# Patient Record
Sex: FEMALE | Race: BLACK OR AFRICAN AMERICAN | NOT HISPANIC OR LATINO | Employment: UNEMPLOYED | URBAN - METROPOLITAN AREA
[De-identification: names, ages, dates, MRNs, and addresses within clinical notes are randomized per-mention and may not be internally consistent; named-entity substitution may affect disease eponyms.]

---

## 2023-03-10 ENCOUNTER — DOCUMENTATION (OUTPATIENT)
Dept: POST ACUTE CARE | Facility: EXTERNAL LOCATION | Age: 63
End: 2023-03-10

## 2023-03-10 ENCOUNTER — NURSING HOME VISIT (OUTPATIENT)
Dept: POST ACUTE CARE | Facility: EXTERNAL LOCATION | Age: 63
End: 2023-03-10
Payer: MEDICARE

## 2023-03-10 DIAGNOSIS — E78.00 PURE HYPERCHOLESTEROLEMIA: Primary | ICD-10-CM

## 2023-03-10 DIAGNOSIS — E11.00 TYPE 2 DIABETES MELLITUS WITH HYPEROSMOLARITY WITHOUT COMA, WITHOUT LONG-TERM CURRENT USE OF INSULIN (MULTI): ICD-10-CM

## 2023-03-10 DIAGNOSIS — F25.8 OTHER SCHIZOAFFECTIVE DISORDERS (MULTI): ICD-10-CM

## 2023-03-10 DIAGNOSIS — I10 PRIMARY HYPERTENSION: ICD-10-CM

## 2023-03-10 PROBLEM — G35: Status: ACTIVE | Noted: 2023-03-10

## 2023-03-10 PROBLEM — I26.99 PULMONARY EMBOLISM (MULTI): Status: ACTIVE | Noted: 2023-03-10

## 2023-03-10 PROBLEM — J44.9 COPD (CHRONIC OBSTRUCTIVE PULMONARY DISEASE) (MULTI): Status: ACTIVE | Noted: 2023-03-10

## 2023-03-10 PROBLEM — R56.9 SEIZURE (MULTI): Status: ACTIVE | Noted: 2023-03-10

## 2023-03-10 PROCEDURE — 99308 SBSQ NF CARE LOW MDM 20: CPT | Performed by: INTERNAL MEDICINE

## 2023-03-10 NOTE — LETTER
Subjective- R seen for routine follow up  Today she is sitting in her room.    Ros-  Gen- alert,NAD  Chest- no pain, no palpitations  Resp- no cough, no sOB  Abd- no pain. No N/V/D/C  Psych- depression+  Ext- swelling +    Biological/Physical:  VS- /86 T 97.1 P 69 Wt 212 Glucose- 200  Gen- NAD, sitting in bed  Chest- CTA B/L  CVS s1s2 RRR  Abd- soft, non tender  Ext- 2+ foot edema present    A/P  Seizures - c/w Keppra BID  Multiple Sclerosis w paraplegia  c/w Gilenya  c/w baclofen 15 mg for stiffness  c/w cyclobenzaprine for spasms  HTN, edema -c/w Lasix 40 mg daily  c/w ace wraps  Seizures- c/w Keppra  DM-2 - c/w metformin 500 BID, novolog  monitor  Schizoaffective dx- - stable  continue with Lexapro and Abilify  H/O PE- c/w Xarelto  COPD- stable

## 2023-03-20 LAB
BASOPHILS (10*3/UL) IN BLOOD BY AUTOMATED COUNT: 0.03 X10E9/L (ref 0–0.1)
BASOPHILS/100 LEUKOCYTES IN BLOOD BY AUTOMATED COUNT: 0.3 % (ref 0–2)
EOSINOPHILS (10*3/UL) IN BLOOD BY AUTOMATED COUNT: 0.49 X10E9/L (ref 0–0.7)
EOSINOPHILS/100 LEUKOCYTES IN BLOOD BY AUTOMATED COUNT: 4.4 % (ref 0–6)
ERYTHROCYTE DISTRIBUTION WIDTH (RATIO) BY AUTOMATED COUNT: 17.1 % (ref 11.5–14.5)
ERYTHROCYTE MEAN CORPUSCULAR HEMOGLOBIN CONCENTRATION (G/DL) BY AUTOMATED: 29.6 G/DL (ref 32–36)
ERYTHROCYTE MEAN CORPUSCULAR VOLUME (FL) BY AUTOMATED COUNT: 82 FL (ref 80–100)
ERYTHROCYTES (10*6/UL) IN BLOOD BY AUTOMATED COUNT: 4.47 X10E12/L (ref 4–5.2)
HEMATOCRIT (%) IN BLOOD BY AUTOMATED COUNT: 36.8 % (ref 36–46)
HEMOGLOBIN (G/DL) IN BLOOD: 10.9 G/DL (ref 12–16)
IMMATURE GRANULOCYTES/100 LEUKOCYTES IN BLOOD BY AUTOMATED COUNT: 0.5 % (ref 0–0.9)
LEUKOCYTES (10*3/UL) IN BLOOD BY AUTOMATED COUNT: 11 X10E9/L (ref 4.4–11.3)
LYMPHOCYTES (10*3/UL) IN BLOOD BY AUTOMATED COUNT: 1.19 X10E9/L (ref 1.2–4.8)
LYMPHOCYTES/100 LEUKOCYTES IN BLOOD BY AUTOMATED COUNT: 10.8 % (ref 13–44)
MONOCYTES (10*3/UL) IN BLOOD BY AUTOMATED COUNT: 0.77 X10E9/L (ref 0.1–1)
MONOCYTES/100 LEUKOCYTES IN BLOOD BY AUTOMATED COUNT: 7 % (ref 2–10)
NEUTROPHILS (10*3/UL) IN BLOOD BY AUTOMATED COUNT: 8.5 X10E9/L (ref 1.2–7.7)
NEUTROPHILS/100 LEUKOCYTES IN BLOOD BY AUTOMATED COUNT: 77 % (ref 40–80)
NRBC (PER 100 WBCS) BY AUTOMATED COUNT: 0 /100 WBC (ref 0–0)
PLATELETS (10*3/UL) IN BLOOD AUTOMATED COUNT: 352 X10E9/L (ref 150–450)

## 2023-04-07 ENCOUNTER — NURSING HOME VISIT (OUTPATIENT)
Dept: PRIMARY CARE | Facility: CLINIC | Age: 63
End: 2023-04-07
Payer: MEDICARE

## 2023-04-07 DIAGNOSIS — J43.9 PULMONARY EMPHYSEMA, UNSPECIFIED EMPHYSEMA TYPE (MULTI): ICD-10-CM

## 2023-04-07 DIAGNOSIS — R56.9 SEIZURE (MULTI): ICD-10-CM

## 2023-04-07 DIAGNOSIS — G35 MULTIPLE SCLEROSIS, TRANSITIONAL (MULTI): Primary | ICD-10-CM

## 2023-04-07 DIAGNOSIS — I10 PRIMARY HYPERTENSION: ICD-10-CM

## 2023-04-07 DIAGNOSIS — I82.4Y2 ACUTE DEEP VEIN THROMBOSIS (DVT) OF PROXIMAL VEIN OF LEFT LOWER EXTREMITY (MULTI): ICD-10-CM

## 2023-04-07 DIAGNOSIS — I26.99 PULMONARY EMBOLISM WITHOUT ACUTE COR PULMONALE, UNSPECIFIED CHRONICITY, UNSPECIFIED PULMONARY EMBOLISM TYPE (MULTI): ICD-10-CM

## 2023-04-07 PROCEDURE — 99309 SBSQ NF CARE MODERATE MDM 30: CPT | Performed by: INTERNAL MEDICINE

## 2023-04-07 NOTE — PROGRESS NOTES
Subjective- R seen for follow up after recent hospitalization. H/O HTN,MS with paraplegia,DM-2, admitted for SOB, fever. Had LE cellulitis, requiring iv antibiotics for sepsis.Given vanco/zosyn. Neurology consulted for MS. U/S positive for R femoral DVT. Xarelto d/cd and started on eliquis. Today sitting in her room.    Ros-  Gen-alert,NAD  Chest- no pain, no palpitations  Resp- cough +, no sOB  Abd- no pain. No N/V/D/C  Psych- depression+  Ext- swelling +    Biological/Physical:  VS- /72 T 97.8 P 84 Wt 227 Glucose- 86  Gen- NAD, sitting in bed  Chest- CTA B/L  CVS s1s2 RRR  Abd- soft, non tender  Ext- 2+ foot edema present    A/P  Sepsis due to LE cellulitis- s/p iv antibiotics  c/w bactrim po  Acute LLE DVT, past h/o PE- c/w eliquis  Multiple Sclerosis w paraplegia  c/w Gilenya  c/w baclofen 15mg for stiffness  c/w cyclobenzaprine for spasms  HTN, edema -c/w Lasix 40 mg daily  c/w ace wraps  Seizures- c/w Keppra  DM-2 - c/w metformin 500 BID  monitor  Schizoaffective dx- - stable  continue with Lexapro and Abilify  COPD- stable

## 2023-04-19 ENCOUNTER — NURSING HOME VISIT (OUTPATIENT)
Dept: POST ACUTE CARE | Facility: EXTERNAL LOCATION | Age: 63
End: 2023-04-19
Payer: MEDICARE

## 2023-04-19 DIAGNOSIS — E11.69 TYPE 2 DIABETES MELLITUS WITH OTHER SPECIFIED COMPLICATION, WITHOUT LONG-TERM CURRENT USE OF INSULIN (MULTI): ICD-10-CM

## 2023-04-19 DIAGNOSIS — I10 PRIMARY HYPERTENSION: Primary | ICD-10-CM

## 2023-04-19 DIAGNOSIS — I82.419 ACUTE DEEP VEIN THROMBOSIS (DVT) OF FEMORAL VEIN, UNSPECIFIED LATERALITY (MULTI): ICD-10-CM

## 2023-04-19 DIAGNOSIS — I26.99 PULMONARY EMBOLISM WITHOUT ACUTE COR PULMONALE, UNSPECIFIED CHRONICITY, UNSPECIFIED PULMONARY EMBOLISM TYPE (MULTI): ICD-10-CM

## 2023-04-19 DIAGNOSIS — G35 MULTIPLE SCLEROSIS, TRANSITIONAL (MULTI): ICD-10-CM

## 2023-04-19 DIAGNOSIS — R56.9 SEIZURE (MULTI): ICD-10-CM

## 2023-04-19 PROCEDURE — 99308 SBSQ NF CARE LOW MDM 20: CPT | Performed by: INTERNAL MEDICINE

## 2023-04-19 NOTE — LETTER
Patient: Arabella Choi  : 1960    Encounter Date: 2023    Subjective- R seen for follow up after recent hospitalization. Admitted for sepsis s/p abx. diagnosed with R femoral DVT. Xarelto d/cd and started on eliquis. Today sitting in her room.  Ros-  Gen-alert,NAD  Chest- no pain, no palpitations  Resp- cough +, no sOB  Abd- no pain. No N/V/D/C  Psych- depression+  Ext- swelling +  Biological/Physical:  VS- /66 T 97.6 P 86 Wt 210 Glucose- 86  Gen- NAD, sitting in bed  Chest- CTA B/L  CVS s1s2 RRR  Abd- soft, non tender  Ext- 2+ foot edema present  A/P  Sepsis due to LE cellulitis- s/p bactrim  Acute LLE DVT, past h/o PE- c/w eliquis  Multiple Sclerosis w paraplegia  c/w Gilenya  c/w baclofen 15mg for stiffness  c/w cyclobenzaprine for spasms  HTN, edema -c/w Lasix 40 mg daily  c/w ace wraps  Seizures- c/w Keppra  DM-2 - c/w metformin 500 BID  Schizoaffective dx- - stable  continue with Lexapro and Abilify  COPD- stable      Electronically Signed By: Ean Santamaria MD   23 10:16 AM

## 2023-04-21 PROBLEM — M79.7 FIBROMYALGIA: Status: ACTIVE | Noted: 2023-04-21

## 2023-04-21 PROBLEM — M06.9 RHEUMATOID ARTHRITIS (MULTI): Status: ACTIVE | Noted: 2023-04-21

## 2023-04-21 PROBLEM — E78.5 HYPERLIPIDEMIA: Status: ACTIVE | Noted: 2023-04-21

## 2023-04-21 PROBLEM — G81.94 LEFT HEMIPLEGIA (MULTI): Status: ACTIVE | Noted: 2023-04-21

## 2023-04-21 PROBLEM — E11.9 TYPE 2 DIABETES MELLITUS (MULTI): Status: ACTIVE | Noted: 2023-04-21

## 2023-04-21 NOTE — PROGRESS NOTES
Subjective- R seen for follow up after recent hospitalization. Admitted for sepsis s/p abx. diagnosed with R femoral DVT. Xarelto d/cd and started on eliquis. Today sitting in her room.  Ros-  Gen-alert,NAD  Chest- no pain, no palpitations  Resp- cough +, no sOB  Abd- no pain. No N/V/D/C  Psych- depression+  Ext- swelling +  Biological/Physical:  VS- /66 T 97.6 P 86 Wt 210 Glucose- 86  Gen- NAD, sitting in bed  Chest- CTA B/L  CVS s1s2 RRR  Abd- soft, non tender  Ext- 2+ foot edema present  A/P  Sepsis due to LE cellulitis- s/p bactrim  Acute LLE DVT, past h/o PE- c/w eliquis  Multiple Sclerosis w paraplegia  c/w Gilenya  c/w baclofen 15mg for stiffness  c/w cyclobenzaprine for spasms  HTN, edema -c/w Lasix 40 mg daily  c/w ace wraps  Seizures- c/w Keppra  DM-2 - c/w metformin 500 BID  Schizoaffective dx- - stable  continue with Lexapro and Abilify  COPD- stable

## 2023-05-24 ENCOUNTER — NURSING HOME VISIT (OUTPATIENT)
Dept: POST ACUTE CARE | Facility: EXTERNAL LOCATION | Age: 63
End: 2023-05-24
Payer: MEDICARE

## 2023-05-24 DIAGNOSIS — E11.69 TYPE 2 DIABETES MELLITUS WITH OTHER SPECIFIED COMPLICATION, WITHOUT LONG-TERM CURRENT USE OF INSULIN (MULTI): ICD-10-CM

## 2023-05-24 DIAGNOSIS — I10 PRIMARY HYPERTENSION: Primary | ICD-10-CM

## 2023-05-24 DIAGNOSIS — G35 MULTIPLE SCLEROSIS, TRANSITIONAL (MULTI): ICD-10-CM

## 2023-05-24 DIAGNOSIS — I26.99 PULMONARY EMBOLISM WITHOUT ACUTE COR PULMONALE, UNSPECIFIED CHRONICITY, UNSPECIFIED PULMONARY EMBOLISM TYPE (MULTI): ICD-10-CM

## 2023-05-24 PROCEDURE — 99308 SBSQ NF CARE LOW MDM 20: CPT | Performed by: INTERNAL MEDICINE

## 2023-05-24 NOTE — LETTER
Patient: Arabella Choi  : 1960    Encounter Date: 2023    subjective- resident sitting in her bed. She deniesany pain. She has a chronic blistering rash on both legs.  Ros-  Gen-alert,NAD  Chest- no pain, no palpitations  Resp- cough +, no sOB  Abd- no pain. No N/V/D/C  Psych- depression+  Ext- rash+  Physical exam:  VS- /66 T 97.3 P 86 Wt 204.4 Glucose- 86  Gen- NAD, sitting in bed  Chest- CTA B/L  CVS s1s2 RRR  Abd- soft, non tender  Ext- 2+ foot edema present  A/P  Acute LLE DVT, past h/o PE- c/w eliquis  Multiple Sclerosis w paraplegia  c/w Gilenya  c/w baclofen 15mg for stiffness  c/w cyclobenzaprine for spasms  HTN, edema -c/w Lasix 40 mg daily  c/w ace wraps  Seizures- c/w Keppra  DM-2 - c/w metformin 500 BID  Schizoaffective dx- - stable  continue with Lexapro and Abilify  COPD- stable      Electronically Signed By: Ean Santamaria MD   23  8:08 PM

## 2023-05-25 NOTE — PROGRESS NOTES
subjective- resident sitting in her bed. She deniesany pain. She has a chronic blistering rash on both legs.  Ros-  Gen-alert,NAD  Chest- no pain, no palpitations  Resp- cough +, no sOB  Abd- no pain. No N/V/D/C  Psych- depression+  Ext- rash+  Physical exam:  VS- /66 T 97.3 P 86 Wt 204.4 Glucose- 86  Gen- NAD, sitting in bed  Chest- CTA B/L  CVS s1s2 RRR  Abd- soft, non tender  Ext- 2+ foot edema present  A/P  Acute LLE DVT, past h/o PE- c/w eliquis  Multiple Sclerosis w paraplegia  c/w Gilenya  c/w baclofen 15mg for stiffness  c/w cyclobenzaprine for spasms  HTN, edema -c/w Lasix 40 mg daily  c/w ace wraps  Seizures- c/w Keppra  DM-2 - c/w metformin 500 BID  Schizoaffective dx- - stable  continue with Lexapro and Abilify  COPD- stable

## 2023-05-31 ENCOUNTER — NURSING HOME VISIT (OUTPATIENT)
Dept: POST ACUTE CARE | Facility: EXTERNAL LOCATION | Age: 63
End: 2023-05-31
Payer: MEDICARE

## 2023-05-31 DIAGNOSIS — G35 MULTIPLE SCLEROSIS, TRANSITIONAL (MULTI): ICD-10-CM

## 2023-05-31 DIAGNOSIS — F32.1 CURRENT MODERATE EPISODE OF MAJOR DEPRESSIVE DISORDER WITHOUT PRIOR EPISODE (MULTI): ICD-10-CM

## 2023-05-31 DIAGNOSIS — F25.8 OTHER SCHIZOAFFECTIVE DISORDERS (MULTI): ICD-10-CM

## 2023-05-31 DIAGNOSIS — D50.0 IRON DEFICIENCY ANEMIA DUE TO CHRONIC BLOOD LOSS: ICD-10-CM

## 2023-05-31 DIAGNOSIS — E11.00 TYPE 2 DIABETES MELLITUS WITH HYPEROSMOLARITY WITHOUT COMA, WITHOUT LONG-TERM CURRENT USE OF INSULIN (MULTI): ICD-10-CM

## 2023-05-31 DIAGNOSIS — I10 PRIMARY HYPERTENSION: Primary | ICD-10-CM

## 2023-05-31 DIAGNOSIS — J41.0 SIMPLE CHRONIC BRONCHITIS (MULTI): ICD-10-CM

## 2023-05-31 PROCEDURE — 99309 SBSQ NF CARE MODERATE MDM 30: CPT | Performed by: INTERNAL MEDICINE

## 2023-05-31 NOTE — LETTER
Patient: Arabella Choi  : 1960    Encounter Date: 2023    Subjective- resident seen for feeling more depressed, very low motivation.  Ros-  Gen-alert,NAD  Chest- no pain, no palpitations  Resp- cough +, no sOB  Abd- no pain. No N/V/D/C  Psych- depression+  Ext- rash+  Physical exam:  VS- /66 T 97.6 P 86 Wt 204.4 Glucose- 94  Gen- NAD, lying in bed  Chest- CTA B/L  CVS s1s2 RRR  Abd- soft, non tender  Ext- 2+ foot edema present  A/P  Depression- worsening  Remeron increased by Psych  Acute LLE DVT, past h/o PE- c/w eliquis  Multiple Sclerosis w paraplegia  c/w Gilenya  c/w baclofen 15mg for stiffness  c/w cyclobenzaprine for spasms  HTN, edema -c/w Lasix 40 mg daily  c/w ace wraps  Seizures- c/w Keppra  DM-2 - c/w metformin 500 BID  Schizoaffective dx- - stable  continue with Lexapro and Abilify  Anemia- H/H 9.2/29.3(4/10) from 11.4/37.5- recheck labs      Electronically Signed By: Ean Santamaria MD   23  8:22 PM

## 2023-06-01 NOTE — PROGRESS NOTES
Subjective- resident seen for feeling more depressed, very low motivation.  Ros-  Gen-alert,NAD  Chest- no pain, no palpitations  Resp- cough +, no sOB  Abd- no pain. No N/V/D/C  Psych- depression+  Ext- rash+  Physical exam:  VS- /66 T 97.6 P 86 Wt 204.4 Glucose- 94  Gen- NAD, lying in bed  Chest- CTA B/L  CVS s1s2 RRR  Abd- soft, non tender  Ext- 2+ foot edema present  A/P  Depression- worsening  Remeron increased by Psych  Acute LLE DVT, past h/o PE- c/w eliquis  Multiple Sclerosis w paraplegia  c/w Gilenya  c/w baclofen 15mg for stiffness  c/w cyclobenzaprine for spasms  HTN, edema -c/w Lasix 40 mg daily  c/w ace wraps  Seizures- c/w Keppra  DM-2 - c/w metformin 500 BID  Schizoaffective dx- - stable  continue with Lexapro and Abilify  Anemia- H/H 9.2/29.3(4/10) from 11.4/37.5- recheck labs

## 2023-06-28 ENCOUNTER — NURSING HOME VISIT (OUTPATIENT)
Dept: POST ACUTE CARE | Facility: EXTERNAL LOCATION | Age: 63
End: 2023-06-28
Payer: MEDICARE

## 2023-06-28 DIAGNOSIS — E78.49 OTHER HYPERLIPIDEMIA: ICD-10-CM

## 2023-06-28 DIAGNOSIS — E11.00 TYPE 2 DIABETES MELLITUS WITH HYPEROSMOLARITY WITHOUT COMA, WITHOUT LONG-TERM CURRENT USE OF INSULIN (MULTI): ICD-10-CM

## 2023-06-28 DIAGNOSIS — I10 PRIMARY HYPERTENSION: Primary | ICD-10-CM

## 2023-06-28 DIAGNOSIS — G35 MULTIPLE SCLEROSIS, TRANSITIONAL (MULTI): ICD-10-CM

## 2023-06-28 PROCEDURE — 99308 SBSQ NF CARE LOW MDM 20: CPT | Performed by: INTERNAL MEDICINE

## 2023-06-28 NOTE — LETTER
Patient: Arabella Choi  : 1960    Encounter Date: 2023    Subjective- resident sitting in her bed. Appetite- plenty of snacks and ordering out. Foot swelling worse.  Ros-  Gen-alert,NAD  Chest- no pain, no palpitations  Resp- cough +, no sOB  Abd- no pain. No N/V/D/C  Psych- depression+  Ext- pedal edema 3+  Physical exam:  VS- /66 T 97.7 P 86 Wt 213.8 Glucose- 119  Gen- NAD, sitting in bed  Chest- CTA B/L  CVS s1s2 RRR  Abd- soft, non tender  Ext- 2+ foot edema present  A/P  Schizoeffective disorder,Depression- c/w Remeron 30 mg, Abilify,lexapro  follow with psych  DVT-LLE, h/o PE- c/w eliquis  MultipleSclerosis w paraplegia  c/w Gilenya  c/w baclofen 15mg for stiffness  c/w cyclobenzaprine for spasms  HTN, edema -c/w Lasix 40 mg daily  h/O Seizures- c/w Keppra  DM-2 - c/w metformin 500 BID  Anemia- H/H 11.2/37.5-stable      Electronically Signed By: Ean Santamaria MD   23  7:43 PM

## 2023-06-28 NOTE — PROGRESS NOTES
Subjective- resident sitting in her bed. Appetite- plenty of snacks and ordering out. Foot swelling worse.  Ros-  Gen-alert,NAD  Chest- no pain, no palpitations  Resp- cough +, no sOB  Abd- no pain. No N/V/D/C  Psych- depression+  Ext- pedal edema 3+  Physical exam:  VS- /66 T 97.7 P 86 Wt 213.8 Glucose- 119  Gen- NAD, sitting in bed  Chest- CTA B/L  CVS s1s2 RRR  Abd- soft, non tender  Ext- 2+ foot edema present  A/P  Schizoeffective disorder,Depression- c/w Remeron 30 mg, Abilify,lexapro  follow with psych  DVT-LLE, h/o PE- c/w eliquis  MultipleSclerosis w paraplegia  c/w Gilenya  c/w baclofen 15mg for stiffness  c/w cyclobenzaprine for spasms  HTN, edema -c/w Lasix 40 mg daily  h/O Seizures- c/w Keppra  DM-2 - c/w metformin 500 BID  Anemia- H/H 11.2/37.5-stable

## 2023-08-09 ENCOUNTER — NURSING HOME VISIT (OUTPATIENT)
Dept: POST ACUTE CARE | Facility: EXTERNAL LOCATION | Age: 63
End: 2023-08-09
Payer: MEDICARE

## 2023-08-09 DIAGNOSIS — E78.49 OTHER HYPERLIPIDEMIA: Primary | ICD-10-CM

## 2023-08-09 DIAGNOSIS — E11.00 TYPE 2 DIABETES MELLITUS WITH HYPEROSMOLARITY WITHOUT COMA, WITHOUT LONG-TERM CURRENT USE OF INSULIN (MULTI): ICD-10-CM

## 2023-08-09 DIAGNOSIS — R56.9 SEIZURE (MULTI): ICD-10-CM

## 2023-08-09 DIAGNOSIS — F25.8 OTHER SCHIZOAFFECTIVE DISORDERS (MULTI): ICD-10-CM

## 2023-08-09 DIAGNOSIS — I26.09 OTHER ACUTE PULMONARY EMBOLISM WITH ACUTE COR PULMONALE (MULTI): ICD-10-CM

## 2023-08-09 DIAGNOSIS — I10 PRIMARY HYPERTENSION: ICD-10-CM

## 2023-08-09 PROCEDURE — 99308 SBSQ NF CARE LOW MDM 20: CPT | Performed by: INTERNAL MEDICINE

## 2023-08-09 NOTE — LETTER
Patient: Arabella Choi  : 1960    Encounter Date: 2023    Subjective- resident sitting in her bed. She is lying in bed. No new staff concerns.  Ros-  Gen-alert,NAD  Chest- no pain, no palpitations  Resp- cough +, no sOB  Abd- no pain. No N/V/D/C  Psych- depression+  Ext- pedal edema 3+  Physical exam:  VS- /66 T 97.7 P 86 Wt 215.6 Glucose- 144  Gen- NAD, sitting in bed  Chest- CTA B/L  CVS s1s2 RRR  Abd- soft, non tender  Ext- 2+ foot edema present  A/P  Schizoeffective disorder,Depression- started on latuda by Psych  c/w lexapro, remeron  DVT-LLE, h/o PE- c/w eliquis  Multiple Sclerosis with paraplegia,weakness  c/w Gilenya  c/w baclofen 15mg for stiffness  c/w cyclobenzaprine for spasms  HTN, edema -c/w Lasix 40 mg daily  h/O Seizures- c/w Keppra  DM-2 - c/w metformin 500 BID  Anemia- stable      Electronically Signed By: Ean Santamaria MD   23  1:59 PM

## 2023-08-14 NOTE — PROGRESS NOTES
Subjective- resident sitting in her bed. She is lying in bed. No new staff concerns.  Ros-  Gen-alert,NAD  Chest- no pain, no palpitations  Resp- cough +, no sOB  Abd- no pain. No N/V/D/C  Psych- depression+  Ext- pedal edema 3+  Physical exam:  VS- /66 T 97.7 P 86 Wt 215.6 Glucose- 144  Gen- NAD, sitting in bed  Chest- CTA B/L  CVS s1s2 RRR  Abd- soft, non tender  Ext- 2+ foot edema present  A/P  Schizoeffective disorder,Depression- started on latuda by Psych  c/w lexapro, remeron  DVT-LLE, h/o PE- c/w eliquis  Multiple Sclerosis with paraplegia,weakness  c/w Gilenya  c/w baclofen 15mg for stiffness  c/w cyclobenzaprine for spasms  HTN, edema -c/w Lasix 40 mg daily  h/O Seizures- c/w Keppra  DM-2 - c/w metformin 500 BID  Anemia- stable

## 2023-09-15 ENCOUNTER — NURSING HOME VISIT (OUTPATIENT)
Dept: POST ACUTE CARE | Facility: EXTERNAL LOCATION | Age: 63
End: 2023-09-15
Payer: MEDICARE

## 2023-09-15 DIAGNOSIS — E11.00 TYPE 2 DIABETES MELLITUS WITH HYPEROSMOLARITY WITHOUT COMA, WITHOUT LONG-TERM CURRENT USE OF INSULIN (MULTI): ICD-10-CM

## 2023-09-15 DIAGNOSIS — R56.9 SEIZURE (MULTI): ICD-10-CM

## 2023-09-15 DIAGNOSIS — I10 PRIMARY HYPERTENSION: Primary | ICD-10-CM

## 2023-09-15 DIAGNOSIS — G35 MULTIPLE SCLEROSIS, TRANSITIONAL (MULTI): ICD-10-CM

## 2023-09-15 DIAGNOSIS — E78.2 MIXED HYPERLIPIDEMIA: ICD-10-CM

## 2023-09-15 PROCEDURE — 99308 SBSQ NF CARE LOW MDM 20: CPT | Performed by: INTERNAL MEDICINE

## 2023-09-15 NOTE — LETTER
Patient: Arabella Choi  : 1960    Encounter Date: 09/15/2023    Subjective- resident sitting in wheelchair. denies pain. No new staff concerns. She had MRI done by neurology.  Ros-  Gen-alert,NAD  Chest- no pain, no palpitations  Resp- cough +, no sOB  Abd- no pain. No N/V/D/C  Psych- depression+  Ext- pedal edema 3+  Physical exam:  VS- /66 T 97.6 P 86 Wt 217.4 Glucose- 138  Gen- NAD, sitting in bed  Chest- CTA B/L  CVS s1s2 RRR  Abd- soft, non tender  Ext- 2+ foot edema present  A/P  Multiple Sclerosis with paraplegia,weakness  c/w Gilenya  c/w baclofen 15mg for stiffness  c/w cyclobenzaprine for spasms  Schizoeffective disorder,Depression- c/w latuda  Followed by Psych  c/w lexapro, remeron  DVT-LLE, h/o PE- c/w eliquis  Multiple Sclerosis with paraplegia,weakness  c/w Gilenya  c/w baclofen 15mg for stiffness  c/w cyclobenzaprine for spasms  HTN, edema -c/w Lasix 40 mg daily  Saw vascular dr Gay today- stated tubi gripsfor leg edema  h/O Seizures- c/w Keppra  DM-2 - c/w metformin 500 BID  Anemia- stable      Electronically Signed By: Ean Santamaria MD   9/15/23  8:34 PM

## 2023-09-16 NOTE — PROGRESS NOTES
Subjective- resident sitting in wheelchair. denies pain. No new staff concerns. She had MRI done by neurology.  Ros-  Gen-alert,NAD  Chest- no pain, no palpitations  Resp- cough +, no sOB  Abd- no pain. No N/V/D/C  Psych- depression+  Ext- pedal edema 3+  Physical exam:  VS- /66 T 97.6 P 86 Wt 217.4 Glucose- 138  Gen- NAD, sitting in bed  Chest- CTA B/L  CVS s1s2 RRR  Abd- soft, non tender  Ext- 2+ foot edema present  A/P  Multiple Sclerosis with paraplegia,weakness  c/w Gilenya  c/w baclofen 15mg for stiffness  c/w cyclobenzaprine for spasms  Schizoeffective disorder,Depression- c/w latuda  Followed by Psych  c/w lexapro, remeron  DVT-LLE, h/o PE- c/w eliquis  Multiple Sclerosis with paraplegia,weakness  c/w Gilenya  c/w baclofen 15mg for stiffness  c/w cyclobenzaprine for spasms  HTN, edema -c/w Lasix 40 mg daily  Saw vascular dr Gay today- stated tubi gripsfor leg edema  h/O Seizures- c/w Keppra  DM-2 - c/w metformin 500 BID  Anemia- stable

## 2023-09-22 PROBLEM — I82.409 DEEP VEIN THROMBOSIS (DVT) OF LOWER EXTREMITY (MULTI): Status: ACTIVE | Noted: 2023-02-19

## 2023-09-22 PROBLEM — Z86.711 HISTORY OF PULMONARY EMBOLISM: Status: ACTIVE | Noted: 2023-04-05

## 2023-09-22 PROBLEM — I99.8 VASCULAR INSUFFICIENCY: Status: ACTIVE | Noted: 2023-09-22

## 2023-09-22 PROBLEM — I89.0 LYMPHEDEMA OF LOWER EXTREMITY: Status: ACTIVE | Noted: 2023-09-22

## 2023-09-22 RX ORDER — NYSTATIN 100000 [USP'U]/G
POWDER TOPICAL
COMMUNITY

## 2023-09-22 RX ORDER — GLIMEPIRIDE 4 MG/1
TABLET ORAL
COMMUNITY
Start: 2023-09-15

## 2023-09-22 RX ORDER — LOVASTATIN 40 MG/1
TABLET ORAL
COMMUNITY

## 2023-09-22 RX ORDER — FENOFIBRATE 40 MG/1
40 TABLET ORAL DAILY
COMMUNITY
Start: 2023-03-09

## 2023-09-22 RX ORDER — POTASSIUM CHLORIDE 750 MG/1
10 TABLET, FILM COATED, EXTENDED RELEASE ORAL DAILY
COMMUNITY
Start: 2022-12-03

## 2023-09-22 RX ORDER — LEVETIRACETAM 500 MG/1
500 TABLET ORAL 2 TIMES DAILY
COMMUNITY
Start: 2023-09-15

## 2023-09-22 RX ORDER — MELATONIN 3 MG
1 CAPSULE ORAL NIGHTLY
COMMUNITY
Start: 2023-03-09

## 2023-09-22 RX ORDER — METFORMIN HYDROCHLORIDE 500 MG/1
TABLET ORAL EVERY 12 HOURS
COMMUNITY
Start: 2023-03-09

## 2023-09-22 RX ORDER — MIRTAZAPINE 15 MG/1
30 TABLET, FILM COATED ORAL NIGHTLY
COMMUNITY
Start: 2023-03-09

## 2023-09-22 RX ORDER — ESCITALOPRAM OXALATE 20 MG/1
TABLET ORAL
COMMUNITY
Start: 2023-09-15

## 2023-09-22 RX ORDER — LURASIDONE HYDROCHLORIDE 60 MG/1
60 TABLET, FILM COATED ORAL NIGHTLY
COMMUNITY
Start: 2023-09-15

## 2023-09-22 RX ORDER — FERROUS SULFATE 325(65) MG
TABLET ORAL
COMMUNITY
Start: 2022-12-03

## 2023-09-22 RX ORDER — ONDANSETRON 4 MG/1
TABLET, ORALLY DISINTEGRATING ORAL 3 TIMES DAILY PRN
COMMUNITY
Start: 2023-03-09

## 2023-09-22 RX ORDER — LORATADINE 10 MG/1
10 TABLET ORAL DAILY PRN
COMMUNITY
Start: 2023-03-09

## 2023-09-22 RX ORDER — ARIPIPRAZOLE 20 MG/1
TABLET ORAL
COMMUNITY
Start: 2023-03-09

## 2023-09-22 RX ORDER — BACLOFEN 10 MG/1
TABLET ORAL EVERY 12 HOURS
COMMUNITY
Start: 2021-09-14

## 2023-09-22 RX ORDER — FUROSEMIDE 40 MG/1
TABLET ORAL
COMMUNITY
Start: 2023-09-15

## 2023-11-15 ENCOUNTER — NURSING HOME VISIT (OUTPATIENT)
Dept: POST ACUTE CARE | Facility: EXTERNAL LOCATION | Age: 63
End: 2023-11-15
Payer: MEDICARE

## 2023-11-15 DIAGNOSIS — E11.00 TYPE 2 DIABETES MELLITUS WITH HYPEROSMOLARITY WITHOUT COMA, WITHOUT LONG-TERM CURRENT USE OF INSULIN (MULTI): ICD-10-CM

## 2023-11-15 DIAGNOSIS — G35 MULTIPLE SCLEROSIS, TRANSITIONAL (MULTI): ICD-10-CM

## 2023-11-15 DIAGNOSIS — I26.99 PULMONARY EMBOLISM WITHOUT ACUTE COR PULMONALE, UNSPECIFIED CHRONICITY, UNSPECIFIED PULMONARY EMBOLISM TYPE (MULTI): ICD-10-CM

## 2023-11-15 DIAGNOSIS — I10 PRIMARY HYPERTENSION: Primary | ICD-10-CM

## 2023-11-15 DIAGNOSIS — F25.8 OTHER SCHIZOAFFECTIVE DISORDERS (MULTI): ICD-10-CM

## 2023-11-15 PROCEDURE — 99309 SBSQ NF CARE MODERATE MDM 30: CPT | Performed by: INTERNAL MEDICINE

## 2023-11-15 NOTE — LETTER
Patient: Arabella Choi  : 1960    Encounter Date: 11/15/2023    Subjective- resident is seen for monthly follow up.She denies pain. No staff concerns. Appetite remains good.  Ros-  Gen-alert,NAD  Chest- no pain, no palpitations  Resp- no cough, no SOB  Abd- no pain. No N/V/D/C  Psych- depression+  Ext- pedal edema 1-2+  Physical exam:  VS- /66 T 97.6 P 86 Wt224 Glucose- 265  Gen- NAD, sitting in bed  Chest- CTA B/L  CVS s1s2 RRR  Abd- soft, non tender  Ext- 2+ foot edema present    A/P  Multiple Sclerosis with paraplegia,weakness  c/w Gilenya  c/w baclofen 15mg for stiffness  c/w cyclobenzaprine for spasms  Follow ith neurology as scheduled  Schizoeffective disorder,Depression- c/w latuda  c/w lexapro, remeron  DVT-LLE, h/o PE- c/w eliquis  Multiple Sclerosis with paraplegia,weakness  c/w Gilenya  c/w baclofen 15mg for stiffness  c/w cyclobenzaprine for spasms  HTN, edema -c/w Lasix 40 mg daily  h/O Seizures- c/w Keppra  DM-2 - c/w metformin, glimepiride  check hba1c  Anemia- stable      Electronically Signed By: Ean Santamaria MD   23 11:01 AM

## 2023-11-16 NOTE — PROGRESS NOTES
Subjective- resident is seen for monthly follow up.She denies pain. No staff concerns. Appetite remains good.  Ros-  Gen-alert,NAD  Chest- no pain, no palpitations  Resp- no cough, no SOB  Abd- no pain. No N/V/D/C  Psych- depression+  Ext- pedal edema 1-2+  Physical exam:  VS- /66 T 97.6 P 86 Wt224 Glucose- 265  Gen- NAD, sitting in bed  Chest- CTA B/L  CVS s1s2 RRR  Abd- soft, non tender  Ext- 2+ foot edema present    A/P  Multiple Sclerosis with paraplegia,weakness  c/w Gilenya  c/w baclofen 15mg for stiffness  c/w cyclobenzaprine for spasms  Follow ith neurology as scheduled  Schizoeffective disorder,Depression- c/w latuda  c/w lexapro, remeron  DVT-LLE, h/o PE- c/w eliquis  Multiple Sclerosis with paraplegia,weakness  c/w Gilenya  c/w baclofen 15mg for stiffness  c/w cyclobenzaprine for spasms  HTN, edema -c/w Lasix 40 mg daily  h/O Seizures- c/w Keppra  DM-2 - c/w metformin, glimepiride  check hba1c  Anemia- stable

## 2023-12-20 ENCOUNTER — NURSING HOME VISIT (OUTPATIENT)
Dept: POST ACUTE CARE | Facility: EXTERNAL LOCATION | Age: 63
End: 2023-12-20
Payer: MEDICARE

## 2023-12-20 DIAGNOSIS — I26.99 PULMONARY EMBOLISM WITHOUT ACUTE COR PULMONALE, UNSPECIFIED CHRONICITY, UNSPECIFIED PULMONARY EMBOLISM TYPE (MULTI): ICD-10-CM

## 2023-12-20 DIAGNOSIS — G35 MULTIPLE SCLEROSIS, TRANSITIONAL (MULTI): ICD-10-CM

## 2023-12-20 DIAGNOSIS — E11.00 TYPE 2 DIABETES MELLITUS WITH HYPEROSMOLARITY WITHOUT COMA, WITHOUT LONG-TERM CURRENT USE OF INSULIN (MULTI): ICD-10-CM

## 2023-12-20 DIAGNOSIS — I10 PRIMARY HYPERTENSION: Primary | ICD-10-CM

## 2023-12-20 PROCEDURE — 99308 SBSQ NF CARE LOW MDM 20: CPT | Performed by: INTERNAL MEDICINE

## 2023-12-20 NOTE — LETTER
Patient: Arabella Choi  : 1960    Encounter Date: 2023    Subjective- resident is seen for monthly follow up.She is lying in bed. denies pain. No staff concerns. Appetite remains good.  Ros-  Gen-alert,NAD  Chest- no pain, no palpitations  Resp- no cough, no SOB  Abd- no pain. No N/V/D/C  Psych- depression+  Ext- pedal edema 1-2+  Physical exam:  VS- /66 T 97.6 P 86 Wt 225 Glucose- 266  Gen- NAD, sitting in bed  Chest- CTA B/L  CVS s1s2 RRR  Abd- soft, non tender  Ext- 2+ foot edema present  A/P  Multiple Sclerosis with paraplegia,weakness  c/w Gilenya  c/w baclofen 15mg for stiffness  c/w cyclobenzaprine  Follow eith neurology as scheduled  Schizoeffective disorder,Depression- c/w latuda  c/w lexapro, remeron  DVT-LLE, h/o PE- c/w eliquis  HTN, edema -c/w Lasix 40 mg daily  h/O Seizures- c/w Keppra  DM-2 - c/w metformin, glimepiride  hba1c 7.4      Electronically Signed By: Ean Santamaria MD   23 10:55 AM

## 2023-12-22 NOTE — PROGRESS NOTES
Subjective- resident is seen for monthly follow up.She is lying in bed. denies pain. No staff concerns. Appetite remains good.  Ros-  Gen-alert,NAD  Chest- no pain, no palpitations  Resp- no cough, no SOB  Abd- no pain. No N/V/D/C  Psych- depression+  Ext- pedal edema 1-2+  Physical exam:  VS- /66 T 97.6 P 86 Wt 225 Glucose- 266  Gen- NAD, sitting in bed  Chest- CTA B/L  CVS s1s2 RRR  Abd- soft, non tender  Ext- 2+ foot edema present  A/P  Multiple Sclerosis with paraplegia,weakness  c/w Gilenya  c/w baclofen 15mg for stiffness  c/w cyclobenzaprine  Follow eith neurology as scheduled  Schizoeffective disorder,Depression- c/w latuda  c/w lexapro, remeron  DVT-LLE, h/o PE- c/w eliquis  HTN, edema -c/w Lasix 40 mg daily  h/O Seizures- c/w Keppra  DM-2 - c/w metformin, glimepiride  hba1c 7.4

## 2023-12-28 ENCOUNTER — OFFICE VISIT (OUTPATIENT)
Dept: NEPHROLOGY | Facility: CLINIC | Age: 63
End: 2023-12-28
Payer: MEDICARE

## 2023-12-28 VITALS
SYSTOLIC BLOOD PRESSURE: 131 MMHG | HEART RATE: 90 BPM | HEIGHT: 64 IN | DIASTOLIC BLOOD PRESSURE: 80 MMHG | WEIGHT: 225 LBS | BODY MASS INDEX: 38.41 KG/M2

## 2023-12-28 RX ORDER — ADHESIVE BANDAGE
30 BANDAGE TOPICAL DAILY PRN
COMMUNITY

## 2023-12-28 ASSESSMENT — PAIN SCALES - GENERAL: PAINLEVEL: 0-NO PAIN

## 2024-01-24 ENCOUNTER — NURSING HOME VISIT (OUTPATIENT)
Dept: POST ACUTE CARE | Facility: EXTERNAL LOCATION | Age: 64
End: 2024-01-24
Payer: MEDICARE

## 2024-01-24 DIAGNOSIS — G35 MULTIPLE SCLEROSIS, TRANSITIONAL (MULTI): ICD-10-CM

## 2024-01-24 DIAGNOSIS — I10 PRIMARY HYPERTENSION: Primary | ICD-10-CM

## 2024-01-24 DIAGNOSIS — I26.99 PULMONARY EMBOLISM WITHOUT ACUTE COR PULMONALE, UNSPECIFIED CHRONICITY, UNSPECIFIED PULMONARY EMBOLISM TYPE (MULTI): ICD-10-CM

## 2024-01-24 DIAGNOSIS — E11.00 TYPE 2 DIABETES MELLITUS WITH HYPEROSMOLARITY WITHOUT COMA, WITHOUT LONG-TERM CURRENT USE OF INSULIN (MULTI): ICD-10-CM

## 2024-01-24 DIAGNOSIS — F25.8 OTHER SCHIZOAFFECTIVE DISORDERS (MULTI): ICD-10-CM

## 2024-01-24 PROCEDURE — 99308 SBSQ NF CARE LOW MDM 20: CPT | Performed by: INTERNAL MEDICINE

## 2024-01-24 NOTE — LETTER
Patient: Arabella Choi  : 1960    Encounter Date: 2024    Subjective- resident is seen for monthly follow up. She is sitting in bed. denies pain. No staff concerns. Appetite remains good.  Ros-  Gen-alert,NAD  Chest- no pain, no palpitations  Resp- no cough, no SOB  Abd- no pain. No N/V/D/C  Psych- depression+  Ext- pedal edema 1-2+  Physical exam:  VS- /66 T 97.6 P 86 Wt 227.6 Glucose- 269  Gen- NAD, sitting in bed  Chest- CTA B/L  CVS s1s2 RRR  Abd- soft, non tender  Ext- 2+ foot edema present  A/P  Multiple Sclerosis with paraplegia  c/w Gilenya  c/w baclofen 15mg for stiffness  c/w cyclobenzaprine  Follow with neurology as scheduled  Schizoeffective disorder,Depression- c/w latuda  c/w lexapro, remeron  DVT-LLE, h/o PE- c/w eliquis  HTN, edema -c/w Lasix 40 mg daily  h/O Seizures- c/w Keppra  DM-2 - c/w metformin, glimepiride  Labs ordered      Electronically Signed By: Ean Santamaria MD   24  7:09 PM

## 2024-01-26 NOTE — PROGRESS NOTES
Subjective- resident is seen for monthly follow up. She is sitting in bed. denies pain. No staff concerns. Appetite remains good.  Ros-  Gen-alert,NAD  Chest- no pain, no palpitations  Resp- no cough, no SOB  Abd- no pain. No N/V/D/C  Psych- depression+  Ext- pedal edema 1-2+  Physical exam:  VS- /66 T 97.6 P 86 Wt 227.6 Glucose- 269  Gen- NAD, sitting in bed  Chest- CTA B/L  CVS s1s2 RRR  Abd- soft, non tender  Ext- 2+ foot edema present  A/P  Multiple Sclerosis with paraplegia  c/w Gilenya  c/w baclofen 15mg for stiffness  c/w cyclobenzaprine  Follow with neurology as scheduled  Schizoeffective disorder,Depression- c/w latuda  c/w lexapro, remeron  DVT-LLE, h/o PE- c/w eliquis  HTN, edema -c/w Lasix 40 mg daily  h/O Seizures- c/w Keppra  DM-2 - c/w metformin, glimepiride  Labs ordered

## 2024-02-14 ENCOUNTER — NURSING HOME VISIT (OUTPATIENT)
Dept: POST ACUTE CARE | Facility: EXTERNAL LOCATION | Age: 64
End: 2024-02-14
Payer: MEDICARE

## 2024-02-14 DIAGNOSIS — F25.8 OTHER SCHIZOAFFECTIVE DISORDERS (MULTI): ICD-10-CM

## 2024-02-14 DIAGNOSIS — I10 PRIMARY HYPERTENSION: Primary | ICD-10-CM

## 2024-02-14 DIAGNOSIS — I26.99 PULMONARY EMBOLISM WITHOUT ACUTE COR PULMONALE, UNSPECIFIED CHRONICITY, UNSPECIFIED PULMONARY EMBOLISM TYPE (MULTI): ICD-10-CM

## 2024-02-14 DIAGNOSIS — G35 MULTIPLE SCLEROSIS, TRANSITIONAL (MULTI): ICD-10-CM

## 2024-02-14 DIAGNOSIS — E11.00 TYPE 2 DIABETES MELLITUS WITH HYPEROSMOLARITY WITHOUT COMA, WITHOUT LONG-TERM CURRENT USE OF INSULIN (MULTI): ICD-10-CM

## 2024-02-14 PROCEDURE — 99309 SBSQ NF CARE MODERATE MDM 30: CPT | Performed by: INTERNAL MEDICINE

## 2024-02-14 NOTE — LETTER
Patient: Arabella Choi  : 1960    Encounter Date: 2024    Subjective- resident is seen for monthly follow up.She is sitting in bed. denies pain. No staff concerns. Appetite remains good.  Ros-  Gen-alert,NAD  Chest- no pain, no palpitations  Resp- no cough, no SOB  Abd- no pain. No N/V/D/C  Ext- pedal edema 1-2+  Physical exam:  VS- /66 T 97.6 P 86 Wt 231.6 Glucose- 315  Gen- NAD, sitting in bed  Chest- CTA B/L  CVS s1s2 RRR  Abd- soft, non tender  Ext- 2+ foot edema present  A/P  Multiple Sclerosis with paraplegia  c/w Gilenya  c/w baclofen 15mg for stiffness  c/w cyclobenzaprine  Follow with neurology as scheduled  Schizoeffective disorder,Depression- c/w latuda  c/w lexapro, remeron  DVT-LLE, h/o PE- c/w eliquis  HTN, edema -c/w Lasix 40 mg daily  h/O Seizures- c/w Keppra  DM-2 - Hba1c 9.8 - uncontrolled  c/w metformin, glimepiride  Start lantus 10units daily      Electronically Signed By: Ean Santamaria MD   2/15/24  2:37 PM

## 2024-02-15 NOTE — PROGRESS NOTES
Subjective- resident is seen for monthly follow up.She is sitting in bed. denies pain. No staff concerns. Appetite remains good.  Ros-  Gen-alert,NAD  Chest- no pain, no palpitations  Resp- no cough, no SOB  Abd- no pain. No N/V/D/C  Ext- pedal edema 1-2+  Physical exam:  VS- /66 T 97.6 P 86 Wt 231.6 Glucose- 315  Gen- NAD, sitting in bed  Chest- CTA B/L  CVS s1s2 RRR  Abd- soft, non tender  Ext- 2+ foot edema present  A/P  Multiple Sclerosis with paraplegia  c/w Gilenya  c/w baclofen 15mg for stiffness  c/w cyclobenzaprine  Follow with neurology as scheduled  Schizoeffective disorder,Depression- c/w latuda  c/w lexapro, remeron  DVT-LLE, h/o PE- c/w eliquis  HTN, edema -c/w Lasix 40 mg daily  h/O Seizures- c/w Keppra  DM-2 - Hba1c 9.8 - uncontrolled  c/w metformin, glimepiride  Start lantus 10units daily

## 2024-03-11 ENCOUNTER — HOSPITAL ENCOUNTER (OUTPATIENT)
Dept: RADIOLOGY | Facility: HOSPITAL | Age: 64
Discharge: HOME | End: 2024-03-11
Payer: COMMERCIAL

## 2024-03-11 VITALS — WEIGHT: 224.87 LBS | HEIGHT: 64 IN | BODY MASS INDEX: 38.39 KG/M2

## 2024-03-11 DIAGNOSIS — Z12.31 ENCOUNTER FOR SCREENING MAMMOGRAM FOR MALIGNANT NEOPLASM OF BREAST: ICD-10-CM

## 2024-03-11 PROCEDURE — 77067 SCR MAMMO BI INCL CAD: CPT

## 2024-03-11 PROCEDURE — 77063 BREAST TOMOSYNTHESIS BI: CPT | Performed by: RADIOLOGY

## 2024-03-11 PROCEDURE — 77067 SCR MAMMO BI INCL CAD: CPT | Performed by: RADIOLOGY

## 2024-03-27 ENCOUNTER — NURSING HOME VISIT (OUTPATIENT)
Dept: POST ACUTE CARE | Facility: EXTERNAL LOCATION | Age: 64
End: 2024-03-27
Payer: MEDICARE

## 2024-03-27 DIAGNOSIS — G82.20 PARAPLEGIA, UNSPECIFIED (MULTI): Primary | ICD-10-CM

## 2024-03-27 DIAGNOSIS — R56.9 SEIZURE (MULTI): ICD-10-CM

## 2024-03-27 DIAGNOSIS — E66.01 OBESITY, MORBID (MULTI): ICD-10-CM

## 2024-03-27 DIAGNOSIS — N18.30 STAGE 3 CHRONIC KIDNEY DISEASE, UNSPECIFIED WHETHER STAGE 3A OR 3B CKD (MULTI): ICD-10-CM

## 2024-03-27 DIAGNOSIS — D68.59 OTHER PRIMARY THROMBOPHILIA (MULTI): ICD-10-CM

## 2024-03-27 DIAGNOSIS — I26.09 OTHER ACUTE PULMONARY EMBOLISM WITH ACUTE COR PULMONALE (MULTI): ICD-10-CM

## 2024-03-27 PROBLEM — G81.94 LEFT HEMIPLEGIA (MULTI): Status: RESOLVED | Noted: 2023-04-21 | Resolved: 2024-03-27

## 2024-03-27 PROBLEM — J44.9 COPD (CHRONIC OBSTRUCTIVE PULMONARY DISEASE) (MULTI): Status: RESOLVED | Noted: 2023-03-10 | Resolved: 2024-03-27

## 2024-03-27 PROBLEM — M06.9 RHEUMATOID ARTHRITIS (MULTI): Status: RESOLVED | Noted: 2023-04-21 | Resolved: 2024-03-27

## 2024-03-27 NOTE — LETTER
Patient: Arabella Choi  : 1960    Encounter Date: 2024    Subjective- resident is seen for monthly follow up.She is sitting in bed. denies pain. No staff concerns. Appetite remains good.  Ros-  Gen-alert,NAD  Chest- no pain, no palpitations  Resp- no cough, no SOB  Abd- no pain. No N/V/D/C  Ext- pedal edema 1-2+  Physical exam:  VS- /66 T 97.4 P 86 Wt 228.6 Glucose- 263  Gen- NAD, sitting in bed  Chest- CTA B/L  CVS s1s2 RRR  Abd- soft, non tender  Ext- 2+ foot edema present  A/P  Multiple Sclerosis with paraplegia  c/w Gilenya  c/w baclofen  c/w cyclobenzaprine  Follow with neurology as scheduled  Schizoeffective disorder,Depression- stable  c/w latuda  c/w lexapro, remeron  DVT-LLE, h/o PE- c/w eliquis  HTN, edema -c/w Lasix 40 mg daily  h/O Seizures- c/w Keppra  DM-2 - Hba1c 9.8 - uncontrolled  c/w metformin, glimepiride, lantus      Electronically Signed By: Ean Santamaria MD   3/27/24  3:10 PM

## 2024-03-27 NOTE — PROGRESS NOTES
Subjective- resident is seen for monthly follow up.She is sitting in bed. denies pain. No staff concerns. Appetite remains good.  Ros-  Gen-alert,NAD  Chest- no pain, no palpitations  Resp- no cough, no SOB  Abd- no pain. No N/V/D/C  Ext- pedal edema 1-2+  Physical exam:  VS- /66 T 97.4 P 86 Wt 228.6 Glucose- 263  Gen- NAD, sitting in bed  Chest- CTA B/L  CVS s1s2 RRR  Abd- soft, non tender  Ext- 2+ foot edema present  A/P  Multiple Sclerosis with paraplegia  c/w Gilenya  c/w baclofen  c/w cyclobenzaprine  Follow with neurology as scheduled  Schizoeffective disorder,Depression- stable  c/w latuda  c/w lexapro, remeron  DVT-LLE, h/o PE- c/w eliquis  HTN, edema -c/w Lasix 40 mg daily  h/O Seizures- c/w Keppra  DM-2 - Hba1c 9.8 - uncontrolled  c/w metformin, glimepiride, lantus

## 2024-04-03 ENCOUNTER — NURSING HOME VISIT (OUTPATIENT)
Dept: PRIMARY CARE | Facility: CLINIC | Age: 64
End: 2024-04-03
Payer: MEDICARE

## 2024-04-03 DIAGNOSIS — E08.00 DIABETES MELLITUS DUE TO UNDERLYING CONDITION WITH HYPEROSMOLARITY WITHOUT COMA, WITH LONG-TERM CURRENT USE OF INSULIN (MULTI): Primary | ICD-10-CM

## 2024-04-03 DIAGNOSIS — G35 MULTIPLE SCLEROSIS, TRANSITIONAL (MULTI): ICD-10-CM

## 2024-04-03 DIAGNOSIS — J43.9 PULMONARY EMPHYSEMA, UNSPECIFIED EMPHYSEMA TYPE (MULTI): ICD-10-CM

## 2024-04-03 DIAGNOSIS — Z79.4 DIABETES MELLITUS DUE TO UNDERLYING CONDITION WITH HYPEROSMOLARITY WITHOUT COMA, WITH LONG-TERM CURRENT USE OF INSULIN (MULTI): Primary | ICD-10-CM

## 2024-04-03 DIAGNOSIS — I10 PRIMARY HYPERTENSION: ICD-10-CM

## 2024-04-04 ENCOUNTER — APPOINTMENT (OUTPATIENT)
Dept: RADIOLOGY | Facility: HOSPITAL | Age: 64
End: 2024-04-04
Payer: MEDICARE

## 2024-04-04 ENCOUNTER — HOSPITAL ENCOUNTER (OUTPATIENT)
Dept: RADIOLOGY | Facility: HOSPITAL | Age: 64
Discharge: HOME | End: 2024-04-04
Payer: COMMERCIAL

## 2024-04-04 DIAGNOSIS — N64.59 ABNORMAL BREAST EXAM: ICD-10-CM

## 2024-04-04 PROCEDURE — 76642 ULTRASOUND BREAST LIMITED: CPT | Mod: RIGHT SIDE | Performed by: STUDENT IN AN ORGANIZED HEALTH CARE EDUCATION/TRAINING PROGRAM

## 2024-04-04 PROCEDURE — 76981 USE PARENCHYMA: CPT | Mod: RT

## 2024-04-04 PROCEDURE — 76642 ULTRASOUND BREAST LIMITED: CPT | Mod: RT

## 2024-04-04 NOTE — PROGRESS NOTES
Subjective- resident is seen for follow up on diabetes. Glucose - in 300s. Hba1c 11.2. She eats unhealthy sugary snacks foods all day. Today she is sitting in bed. No staff concerns.  Ros-  Gen-alert,NAD  Chest- no pain, no palpitations  Resp- no cough, no SOB  Abd- no pain. No N/V/D/C  Ext- pedal edema 1+  Physical exam:  VS- /66 T 97.4 P 86 Wt 228.6 Glucose- 307  Gen- NAD, sitting in bed  Chest- CTA B/L  CVS s1s2 RRR  Abd- soft, non tender  Ext- 2+ foot edema present  A/P  DM-2- Hba1c 11.2 un controlled due to noncompliance with diet  Increaselantus to 15 units  Increase glimepiride to 2 mg BID  c/w metformin  Multiple Sclerosis with paraplegia  c/w Gilenya  c/w baclofen  c/w cyclobenzaprine  Schizoeffective disorder,Depression- stable  c/w latuda  c/w lexapro, remeron  DVT-LLE, h/o PE- c/w eliquis  HTN, edema -c/w Lasix 40 mg daily  h/O Seizures- c/w Keppra

## 2024-04-24 ENCOUNTER — NURSING HOME VISIT (OUTPATIENT)
Dept: POST ACUTE CARE | Facility: EXTERNAL LOCATION | Age: 64
End: 2024-04-24
Payer: MEDICARE

## 2024-04-24 DIAGNOSIS — I10 PRIMARY HYPERTENSION: ICD-10-CM

## 2024-04-24 DIAGNOSIS — I26.99 PULMONARY EMBOLISM WITHOUT ACUTE COR PULMONALE, UNSPECIFIED CHRONICITY, UNSPECIFIED PULMONARY EMBOLISM TYPE (MULTI): ICD-10-CM

## 2024-04-24 DIAGNOSIS — G35 MULTIPLE SCLEROSIS, TRANSITIONAL (MULTI): ICD-10-CM

## 2024-04-24 DIAGNOSIS — E11.00 TYPE 2 DIABETES MELLITUS WITH HYPEROSMOLARITY WITHOUT COMA, WITHOUT LONG-TERM CURRENT USE OF INSULIN (MULTI): Primary | ICD-10-CM

## 2024-04-24 NOTE — LETTER
Patient: Arabella Choi  : 1960    Encounter Date: 2024    Subjective- resident is seen for follow up on diabetes. Glucose - in 250s. Hba1c 11.2. She eats unhealthy sugary snacks foods all day. Today she is sitting in bed. No staff concerns.  Ros-  Gen-alert,NAD  Chest- no pain, no palpitations  Resp- no cough, no SOB  Abd- no pain. No N/V/D/C  Ext- pedal edema 1+  Physical exam:  VS- /66 T 97.4 P 86 Wt 228.2 Glucose- 238  Gen- NAD, sitting in bed  Chest- CTA B/L  CVS s1s2 RRR  Abd- soft, non tender  Ext- 2+ foot edema present    A/P  DM-2- Hba1c 11.2 uncontrolled due to noncompliance with diet  Increase lantus to 20 units  Increase glimepiride to 2 mg BID  c/w metformin  Multiple Sclerosis with paraplegia  c/w Gilenya  c/w baclofen  c/w cyclobenzaprine  Schizoeffective disorder,Depression- stable  c/w latuda  c/w lexapro, remeron  DVT-LLE, h/o PE- c/w eliquis  HTN, edema -c/w Lasix 40 mg daily  h/O Seizures- c/w Keppra      Electronically Signed By: Ean Santamaria MD   24 10:47 AM

## 2024-04-25 NOTE — PROGRESS NOTES
Subjective- resident is seen for follow up on diabetes. Glucose - in 250s. Hba1c 11.2. She eats unhealthy sugary snacks foods all day. Today she is sitting in bed. No staff concerns.  Ros-  Gen-alert,NAD  Chest- no pain, no palpitations  Resp- no cough, no SOB  Abd- no pain. No N/V/D/C  Ext- pedal edema 1+  Physical exam:  VS- /66 T 97.4 P 86 Wt 228.2 Glucose- 238  Gen- NAD, sitting in bed  Chest- CTA B/L  CVS s1s2 RRR  Abd- soft, non tender  Ext- 2+ foot edema present    A/P  DM-2- Hba1c 11.2 uncontrolled due to noncompliance with diet  Increase lantus to 20 units  Increase glimepiride to 2 mg BID  c/w metformin  Multiple Sclerosis with paraplegia  c/w Gilenya  c/w baclofen  c/w cyclobenzaprine  Schizoeffective disorder,Depression- stable  c/w latuda  c/w lexapro, remeron  DVT-LLE, h/o PE- c/w eliquis  HTN, edema -c/w Lasix 40 mg daily  h/O Seizures- c/w Keppra

## 2024-05-10 ENCOUNTER — NURSING HOME VISIT (OUTPATIENT)
Dept: POST ACUTE CARE | Facility: EXTERNAL LOCATION | Age: 64
End: 2024-05-10
Payer: MEDICARE

## 2024-05-10 DIAGNOSIS — I26.99 PULMONARY EMBOLISM WITHOUT ACUTE COR PULMONALE, UNSPECIFIED CHRONICITY, UNSPECIFIED PULMONARY EMBOLISM TYPE (MULTI): ICD-10-CM

## 2024-05-10 DIAGNOSIS — I10 PRIMARY HYPERTENSION: ICD-10-CM

## 2024-05-10 DIAGNOSIS — E11.00 TYPE 2 DIABETES MELLITUS WITH HYPEROSMOLARITY WITHOUT COMA, WITHOUT LONG-TERM CURRENT USE OF INSULIN (MULTI): Primary | ICD-10-CM

## 2024-05-10 DIAGNOSIS — G35 MULTIPLE SCLEROSIS, TRANSITIONAL (MULTI): ICD-10-CM

## 2024-05-10 NOTE — LETTER
Patient: Arabella Choi  : 1960    Encounter Date: 05/10/2024    Subjective- resident is seen for follow up on diabetes.Hba1c 11.2. She eats unhealthy snacks foods. Today she is sitting in bed. No staff concerns.  Ros-  Gen-alert,NAD  Chest- no pain, no palpitations  Resp- no cough, no SOB  Abd- no pain. No N/V/D/C  Ext- pedal edema 1+  Physical exam:  VS- /66 T 97.4 P 86 Wt 225.2 Glucose- 238  Gen- NAD, sitting in bed  Chest- CTA B/L  CVS s1s2 RRR  Abd- soft, non tender  Ext- 2+ foot edema present  A/P  DM-2- Hba1c 11.2  c/w lantus to 20 units  c/w glimepiride to 2 mg BID  c/w metformin  encouraged her tocut back on unhealthy foods  Multiple Sclerosis with paraplegia- stable  c/w Gilenya  c/w baclofen  c/w cyclobenzaprine  Schizoeffective disorder,Depression- stable  c/w latuda  c/w lexapro, remeron  DVT-LLE, h/o PE- c/w eliquis  HTN, edema -c/w Lasix 40mg daily  h/O Seizures- c/w Keppra      Electronically Signed By: Ean Santamaria MD   5/15/24  5:12 PM

## 2024-05-15 NOTE — PROGRESS NOTES
Subjective- resident is seen for follow up on diabetes.Hba1c 11.2. She eats unhealthy snacks foods. Today she is sitting in bed. No staff concerns.  Ros-  Gen-alert,NAD  Chest- no pain, no palpitations  Resp- no cough, no SOB  Abd- no pain. No N/V/D/C  Ext- pedal edema 1+  Physical exam:  VS- /66 T 97.4 P 86 Wt 225.2 Glucose- 238  Gen- NAD, sitting in bed  Chest- CTA B/L  CVS s1s2 RRR  Abd- soft, non tender  Ext- 2+ foot edema present  A/P  DM-2- Hba1c 11.2  c/w lantus to 20 units  c/w glimepiride to 2 mg BID  c/w metformin  encouraged her tocut back on unhealthy foods  Multiple Sclerosis with paraplegia- stable  c/w Gilenya  c/w baclofen  c/w cyclobenzaprine  Schizoeffective disorder,Depression- stable  c/w latuda  c/w lexapro, remeron  DVT-LLE, h/o PE- c/w eliquis  HTN, edema -c/w Lasix 40mg daily  h/O Seizures- c/w Keppra

## 2024-06-12 ENCOUNTER — NURSING HOME VISIT (OUTPATIENT)
Dept: POST ACUTE CARE | Facility: EXTERNAL LOCATION | Age: 64
End: 2024-06-12
Payer: MEDICARE

## 2024-06-12 DIAGNOSIS — I26.99 PULMONARY EMBOLISM WITHOUT ACUTE COR PULMONALE, UNSPECIFIED CHRONICITY, UNSPECIFIED PULMONARY EMBOLISM TYPE (MULTI): ICD-10-CM

## 2024-06-12 DIAGNOSIS — E11.00 TYPE 2 DIABETES MELLITUS WITH HYPEROSMOLARITY WITHOUT COMA, WITHOUT LONG-TERM CURRENT USE OF INSULIN (MULTI): Primary | ICD-10-CM

## 2024-06-12 DIAGNOSIS — I10 PRIMARY HYPERTENSION: ICD-10-CM

## 2024-06-12 DIAGNOSIS — G35 MULTIPLE SCLEROSIS, TRANSITIONAL (MULTI): ICD-10-CM

## 2024-06-12 PROCEDURE — 99308 SBSQ NF CARE LOW MDM 20: CPT | Performed by: INTERNAL MEDICINE

## 2024-06-12 NOTE — LETTER
Patient: Arabella Choi  : 1960    Encounter Date: 2024    Subjective- resident is seen for follow up. She is sitting in bed. No staff concerns.  Ros-  Gen-alert,NAD  Chest- no pain, no palpitations  Resp- no cough, no SOB  Abd- no pain. No N/V/D/C  Ext- pedal edema 1+  Physical exam:  VS- /89 T 96.9 P 92 Wt 225.6 Glucose- 276  Gen- NAD, sitting in bed  Chest- CTA B/L  CVS s1s2 RRR  Abd- soft, non tender  Ext- 1+ pedal edema present B/L  A/P  DM-2- uncontrolled  Hba1c 11.2  c/w lantus 20 units  c/w glimepiride to 2 mg BID  c/w metformin  encouraged her to cut back on unhealthy foods  Multiple Sclerosis with paraplegia- stable  c/w Gilenya  c/w baclofen  c/w cyclobenzaprine  Schizoeffective disorder,Depression- stable  c/w latuda  c/w lexapro, remeron  DVT-LLE, h/o PE- c/w eliquis  HTN, edema -c/w Lasix 40mg daily  h/O Seizures- c/w Keppra  Blood work ordered      Electronically Signed By: Ean Santamaria MD   24  5:40 PM

## 2024-06-12 NOTE — PROGRESS NOTES
Subjective- resident is seen for follow up. She is sitting in bed. No staff concerns.  Ros-  Gen-alert,NAD  Chest- no pain, no palpitations  Resp- no cough, no SOB  Abd- no pain. No N/V/D/C  Ext- pedal edema 1+  Physical exam:  VS- /89 T 96.9 P 92 Wt 225.6 Glucose- 276  Gen- NAD, sitting in bed  Chest- CTA B/L  CVS s1s2 RRR  Abd- soft, non tender  Ext- 1+ pedal edema present B/L  A/P  DM-2- uncontrolled  Hba1c 11.2  c/w lantus 20 units  c/w glimepiride to 2 mg BID  c/w metformin  encouraged her to cut back on unhealthy foods  Multiple Sclerosis with paraplegia- stable  c/w Gilenya  c/w baclofen  c/w cyclobenzaprine  Schizoeffective disorder,Depression- stable  c/w latuda  c/w lexapro, remeron  DVT-LLE, h/o PE- c/w eliquis  HTN, edema -c/w Lasix 40mg daily  h/O Seizures- c/w Keppra  Blood work ordered

## 2024-07-10 ENCOUNTER — NURSING HOME VISIT (OUTPATIENT)
Dept: POST ACUTE CARE | Facility: EXTERNAL LOCATION | Age: 64
End: 2024-07-10
Payer: COMMERCIAL

## 2024-07-10 DIAGNOSIS — E11.00 TYPE 2 DIABETES MELLITUS WITH HYPEROSMOLARITY WITHOUT COMA, WITHOUT LONG-TERM CURRENT USE OF INSULIN (MULTI): Primary | ICD-10-CM

## 2024-07-10 DIAGNOSIS — I10 PRIMARY HYPERTENSION: ICD-10-CM

## 2024-07-10 DIAGNOSIS — G35 MULTIPLE SCLEROSIS, TRANSITIONAL (MULTI): ICD-10-CM

## 2024-07-10 DIAGNOSIS — I26.99 PULMONARY EMBOLISM WITHOUT ACUTE COR PULMONALE, UNSPECIFIED CHRONICITY, UNSPECIFIED PULMONARY EMBOLISM TYPE (MULTI): ICD-10-CM

## 2024-07-10 NOTE — LETTER
Patient: Arabella Choi  : 1960    Encounter Date: 07/10/2024    Subjective- resident is seen for follow up. She is sitting in bed. No staff concerns.  Ros-  Gen-alert,NAD  Chest- no pain, no palpitations  Resp- no cough, no SOB  Abd- no pain. No N/V/D/C  Ext- pedal edema 1+  Physical exam:  VS- /75 T 97.6 P 82 Wt 225.6 Glucose- 156  Gen- NAD, sitting in bed  Chest- CTA B/L  CVS s1s2 RRR  Abd- soft, non tender  Ext- 1+ pedal edema present B/L  A/P  DM-2- uncontrolled  Hba1c 9.5  c/w lantus 20 units  c/w glimepiride 2 mg BID  c/w metformin  encouraged her to cut back on unhealthy foods  Multiple Sclerosis withparaplegia- stable  c/w Gilenya  c/w baclofen  c/w cyclobenzaprine  Schizoeffective disorder,Depression- stable  c/w latuda  c/w lexapro, remeron  DVT-LLE, h/o PE- c/w eliquis  HTN, edema -c/w Lasix 40mg daily  h/O Seizures- c/w Keppra      Electronically Signed By: Ean Santamaria MD   24  1:20 PM

## 2024-07-11 NOTE — PROGRESS NOTES
Subjective- resident is seen for follow up. She is sitting in bed. No staff concerns.  Ros-  Gen-alert,NAD  Chest- no pain, no palpitations  Resp- no cough, no SOB  Abd- no pain. No N/V/D/C  Ext- pedal edema 1+  Physical exam:  VS- /75 T 97.6 P 82 Wt 225.6 Glucose- 156  Gen- NAD, sitting in bed  Chest- CTA B/L  CVS s1s2 RRR  Abd- soft, non tender  Ext- 1+ pedal edema present B/L  A/P  DM-2- uncontrolled  Hba1c 9.5  c/w lantus 20 units  c/w glimepiride 2 mg BID  c/w metformin  encouraged her to cut back on unhealthy foods  Multiple Sclerosis withparaplegia- stable  c/w Gilenya  c/w baclofen  c/w cyclobenzaprine  Schizoeffective disorder,Depression- stable  c/w latuda  c/w lexapro, remeron  DVT-LLE, h/o PE- c/w eliquis  HTN, edema -c/w Lasix 40mg daily  h/O Seizures- c/w Keppra

## 2024-08-14 ENCOUNTER — NURSING HOME VISIT (OUTPATIENT)
Dept: POST ACUTE CARE | Facility: EXTERNAL LOCATION | Age: 64
End: 2024-08-14
Payer: MEDICARE

## 2024-08-14 DIAGNOSIS — I26.99 PULMONARY EMBOLISM WITHOUT ACUTE COR PULMONALE, UNSPECIFIED CHRONICITY, UNSPECIFIED PULMONARY EMBOLISM TYPE (MULTI): ICD-10-CM

## 2024-08-14 DIAGNOSIS — E11.00 TYPE 2 DIABETES MELLITUS WITH HYPEROSMOLARITY WITHOUT COMA, WITHOUT LONG-TERM CURRENT USE OF INSULIN (MULTI): Primary | ICD-10-CM

## 2024-08-14 DIAGNOSIS — G35 MULTIPLE SCLEROSIS, TRANSITIONAL (MULTI): ICD-10-CM

## 2024-08-14 DIAGNOSIS — R56.9 SEIZURE (MULTI): ICD-10-CM

## 2024-08-14 DIAGNOSIS — I10 PRIMARY HYPERTENSION: ICD-10-CM

## 2024-08-14 PROCEDURE — 99309 SBSQ NF CARE MODERATE MDM 30: CPT | Performed by: INTERNAL MEDICINE

## 2024-08-14 NOTE — LETTER
Patient: Arabella Choi  : 1960    Encounter Date: 2024    Subjective- resident is seen for follow up. She is sitting in bed. No staff concerns. Glucose readings running higher- 200-300s. Pt still snacking on unhealthy foods. She c/o loose stools.  Ros-  Gen-alert,NAD  Chest- no pain, no palpitations  Resp- no cough, no SOB  Abd- no pain. No N/V/C, c/o diarrhea  Ext- pedal edema 1+  Physical exam:  VS- /76 T 97.6 P 78 Wt 216.6  Glucose- 174  Gen- NAD, sitting in bed  Chest- CTA B/L  CVS S1S2 RRR  Abd- soft, non tender  Ext- 1+ pedal edema present B/L    A/P  DM-2- uncontrolled  Hba1c 9.5  increase lantus to 23 units  c/w glimepiride 2 mg BID  lower metformin (diarrhea)  encouraged her to cut back on unhealthy foods  Multiple Sclerosis with paraplegia- stable  c/w Gilenya  c/w baclofen  c/w cyclobenzaprine  Schizoeffective disorder,Depression- stable  c/w latuda  c/w lexapro, remeron  DVT-LLE, h/o PE- c/w eliquis  HTN, edema -c/w Lasix 40mg daily  h/O Seizures- c/w Keppra  Keppra level- - normal range  ADL's and CCL's have been reviewed and are current as per care plan.      Electronically Signed By: Ean Santamaria MD   24 12:20 PM

## 2024-08-16 NOTE — PROGRESS NOTES
Subjective- resident is seen for follow up. She is sitting in bed. No staff concerns. Glucose readings running higher- 200-300s. Pt still snacking on unhealthy foods. She c/o loose stools.  Ros-  Gen-alert,NAD  Chest- no pain, no palpitations  Resp- no cough, no SOB  Abd- no pain. No N/V/C, c/o diarrhea  Ext- pedal edema 1+  Physical exam:  VS- /76 T 97.6 P 78 Wt 216.6  Glucose- 174  Gen- NAD, sitting in bed  Chest- CTA B/L  CVS S1S2 RRR  Abd- soft, non tender  Ext- 1+ pedal edema present B/L    A/P  DM-2- uncontrolled  Hba1c 9.5  increase lantus to 23 units  c/w glimepiride 2 mg BID  lower metformin (diarrhea)  encouraged her to cut back on unhealthy foods  Multiple Sclerosis with paraplegia- stable  c/w Gilenya  c/w baclofen  c/w cyclobenzaprine  Schizoeffective disorder,Depression- stable  c/w latuda  c/w lexapro, remeron  DVT-LLE, h/o PE- c/w eliquis  HTN, edema -c/w Lasix 40mg daily  h/O Seizures- c/w Keppra  Keppra level- - normal range  ADL's and CCL's have been reviewed and are current as per care plan.

## 2024-09-11 ENCOUNTER — NURSING HOME VISIT (OUTPATIENT)
Dept: POST ACUTE CARE | Facility: EXTERNAL LOCATION | Age: 64
End: 2024-09-11
Payer: MEDICARE

## 2024-09-11 DIAGNOSIS — E11.00 TYPE 2 DIABETES MELLITUS WITH HYPEROSMOLARITY WITHOUT COMA, WITHOUT LONG-TERM CURRENT USE OF INSULIN (MULTI): Primary | ICD-10-CM

## 2024-09-11 DIAGNOSIS — I26.99 PULMONARY EMBOLISM WITHOUT ACUTE COR PULMONALE, UNSPECIFIED CHRONICITY, UNSPECIFIED PULMONARY EMBOLISM TYPE (MULTI): ICD-10-CM

## 2024-09-11 DIAGNOSIS — G35 MULTIPLE SCLEROSIS, TRANSITIONAL (MULTI): ICD-10-CM

## 2024-09-11 DIAGNOSIS — I10 PRIMARY HYPERTENSION: ICD-10-CM

## 2024-09-11 PROCEDURE — 99309 SBSQ NF CARE MODERATE MDM 30: CPT | Performed by: INTERNAL MEDICINE

## 2024-09-11 NOTE — LETTER
Patient: Arabella Choi  : 1960    Encounter Date: 2024    Subjective- resident is seen for follow up. She is sitting in bed. No staff concerns.  Ros-  Gen-alert,NAD  Chest- no pain, no palpitations  Resp- no cough, no SOB  Abd- no pain. No N/V/C/D  Ext- pedal edema 1+  Physical exam:  VS- /70 T 97.8 P 71 Wt 212.6  Glucose- 221  Gen- NAD, sitting in bed  Chest- CTA B/L  CVS S1S2 RRR  Abd- soft, non tender  Ext- 1+ pedal edema present B/L  A/P  DM-2- uncontrolled  Hba1c 9.5  c/w lantus to 23 units  c/w glimepiride 2 mg BID  lowered metformin due to diarrhea  encouraged her to eat healthy  Multiple Sclerosis with paraplegia- stable  c/w Gilenya  c/w baclofen  c/w cyclobenzaprine  Schizoaffective disorder,Depression- stable  c/w latuda  c/w lexapro, remeron  DVT-LLE, h/o PE- c/w eliquis  HTN, edema -c/w Lasix 40mg daily  h/O Seizures- c/w Keppra      Electronically Signed By: Ean Santamaria MD   24  8:59 PM

## 2024-09-13 NOTE — PROGRESS NOTES
Subjective- resident is seen for follow up. She is sitting in bed. No staff concerns.  Ros-  Gen-alert,NAD  Chest- no pain, no palpitations  Resp- no cough, no SOB  Abd- no pain. No N/V/C/D  Ext- pedal edema 1+  Physical exam:  VS- /70 T 97.8 P 71 Wt 212.6  Glucose- 221  Gen- NAD, sitting in bed  Chest- CTA B/L  CVS S1S2 RRR  Abd- soft, non tender  Ext- 1+ pedal edema present B/L  A/P  DM-2- uncontrolled  Hba1c 9.5  c/w lantus to 23 units  c/w glimepiride 2 mg BID  lowered metformin due to diarrhea  encouraged her to eat healthy  Multiple Sclerosis with paraplegia- stable  c/w Gilenya  c/w baclofen  c/w cyclobenzaprine  Schizoaffective disorder,Depression- stable  c/w latuda  c/w lexapro, remeron  DVT-LLE, h/o PE- c/w eliquis  HTN, edema -c/w Lasix 40mg daily  h/O Seizures- c/w Keppra

## 2024-10-30 ENCOUNTER — NURSING HOME VISIT (OUTPATIENT)
Dept: POST ACUTE CARE | Facility: EXTERNAL LOCATION | Age: 64
End: 2024-10-30
Payer: MEDICARE

## 2024-10-30 DIAGNOSIS — I26.99 PULMONARY EMBOLISM WITHOUT ACUTE COR PULMONALE, UNSPECIFIED CHRONICITY, UNSPECIFIED PULMONARY EMBOLISM TYPE (MULTI): ICD-10-CM

## 2024-10-30 DIAGNOSIS — E11.00 TYPE 2 DIABETES MELLITUS WITH HYPEROSMOLARITY WITHOUT COMA, WITHOUT LONG-TERM CURRENT USE OF INSULIN (MULTI): Primary | ICD-10-CM

## 2024-10-30 DIAGNOSIS — G35 MULTIPLE SCLEROSIS, TRANSITIONAL (MULTI): ICD-10-CM

## 2024-10-30 DIAGNOSIS — I10 PRIMARY HYPERTENSION: ICD-10-CM

## 2024-10-30 PROCEDURE — 99308 SBSQ NF CARE LOW MDM 20: CPT | Performed by: INTERNAL MEDICINE

## 2024-11-27 ENCOUNTER — NURSING HOME VISIT (OUTPATIENT)
Dept: POST ACUTE CARE | Facility: EXTERNAL LOCATION | Age: 64
End: 2024-11-27
Payer: MEDICARE

## 2024-11-27 DIAGNOSIS — E11.00 TYPE 2 DIABETES MELLITUS WITH HYPEROSMOLARITY WITHOUT COMA, WITHOUT LONG-TERM CURRENT USE OF INSULIN (MULTI): Primary | ICD-10-CM

## 2024-11-27 DIAGNOSIS — G35 MULTIPLE SCLEROSIS, TRANSITIONAL (MULTI): ICD-10-CM

## 2024-11-27 DIAGNOSIS — I26.99 PULMONARY EMBOLISM WITHOUT ACUTE COR PULMONALE, UNSPECIFIED CHRONICITY, UNSPECIFIED PULMONARY EMBOLISM TYPE (MULTI): ICD-10-CM

## 2024-11-27 DIAGNOSIS — R56.9 SEIZURE (MULTI): ICD-10-CM

## 2024-11-27 DIAGNOSIS — I10 PRIMARY HYPERTENSION: ICD-10-CM

## 2024-11-27 PROCEDURE — 99308 SBSQ NF CARE LOW MDM 20: CPT | Performed by: INTERNAL MEDICINE

## 2024-11-27 NOTE — LETTER
Patient: Arabella Choi  : 1960    Encounter Date: 2024    Subjective- resident is seen for annual physical. She is sitting in bed. No staff concerns.  Ros-  Gen-alert,NAD  Chest- no pain, no palpitations  Resp- no cough, no SOB  Abd- no pain. No N/V/C/D  Ext- pedal edema 1+  Physical exam:  VS- /81 T 97.6 P 80 Wt 212.6  Glucose- 207  Gen- NAD, sitting inbed  Chest- CTA B/L  CVS S1S2 RRR  Abd- soft, non tender  Ext- 1+ pedal edema present B/L  A/P  DM-2- uncontrolled  Hba1c 8.7  c/w lantus 23 units  c/w glimepiride 2 mg BID  c/w metformin  Multiple Sclerosis with paraplegia- stable  c/w Gilenya  c/w baclofen  c/w cyclobenzaprine  Schizoaffective disorder,Depression- stable  c/w latuda  c/w lexapro, remeron  DVT-LLE, h/o PE- c/w eliquis  HTN, edema -c/w Lasix 40mg daily  h/O Seizures- c/w Keppra      Electronically Signed By: Ean Santamaria MD   24  8:36 PM

## 2024-11-28 NOTE — PROGRESS NOTES
Subjective- resident is seen for annual physical. She is sitting in bed. No staff concerns.  Ros-  Gen-alert,NAD  Chest- no pain, no palpitations  Resp- no cough, no SOB  Abd- no pain. No N/V/C/D  Ext- pedal edema 1+  Physical exam:  VS- /81 T 97.6 P 80 Wt 212.6  Glucose- 207  Gen- NAD, sitting inbed  Chest- CTA B/L  CVS S1S2 RRR  Abd- soft, non tender  Ext- 1+ pedal edema present B/L  A/P  DM-2- uncontrolled  Hba1c 8.7  c/w lantus 23 units  c/w glimepiride 2 mg BID  c/w metformin  Multiple Sclerosis with paraplegia- stable  c/w Gilenya  c/w baclofen  c/w cyclobenzaprine  Schizoaffective disorder,Depression- stable  c/w latuda  c/w lexapro, remeron  DVT-LLE, h/o PE- c/w eliquis  HTN, edema -c/w Lasix 40mg daily  h/O Seizures- c/w Keppra

## 2024-12-11 ENCOUNTER — NURSING HOME VISIT (OUTPATIENT)
Dept: POST ACUTE CARE | Facility: EXTERNAL LOCATION | Age: 64
End: 2024-12-11
Payer: MEDICARE

## 2024-12-11 DIAGNOSIS — I26.99 PULMONARY EMBOLISM WITHOUT ACUTE COR PULMONALE, UNSPECIFIED CHRONICITY, UNSPECIFIED PULMONARY EMBOLISM TYPE (MULTI): ICD-10-CM

## 2024-12-11 DIAGNOSIS — I10 PRIMARY HYPERTENSION: Primary | ICD-10-CM

## 2024-12-11 DIAGNOSIS — G35 MULTIPLE SCLEROSIS, TRANSITIONAL (MULTI): ICD-10-CM

## 2024-12-11 DIAGNOSIS — E11.00 TYPE 2 DIABETES MELLITUS WITH HYPEROSMOLARITY WITHOUT COMA, WITHOUT LONG-TERM CURRENT USE OF INSULIN (MULTI): ICD-10-CM

## 2024-12-11 PROCEDURE — 99308 SBSQ NF CARE LOW MDM 20: CPT | Performed by: INTERNAL MEDICINE

## 2024-12-11 NOTE — LETTER
Patient: Arabella Choi  : 1960    Encounter Date: 2024    Subjective- resident is seen for routine follow up.She is sitting in bed. No staff concerns.    Ros-  Gen-alert,NAD  Chest- no pain, no palpitations  Resp- no cough, no SOB  Abd- no pain. No N/V/C/D  Ext- pedal edema 1+  Physical exam:  VS- /81 T 97.6 P 80 Wt 218.6  Gen- NAD, sitting in bed  Chest- CTA B/L  CVS S1S2 RRR  Abd- soft, non tender  Ext- 1+ pedal edema present B/L    A/P  DM-2- uncontrolled  Hba1c 8.7  c/w lantus 23 units  c/w glimepiride 2 mg BID  c/w metformin  Multiple Sclerosis with paraplegia- stable  c/w Gilenya  c/w baclofen  c/w cyclobenzaprine  Schizoaffective disorder,Depression- stable  c/w latuda  c/w lexapro, remeron  DVT-LLE, h/o PE- c/w eliquis  HTN-c/w Lasix 40mg daily  h/O Seizures- c/w Keppra    ADL's and CCL's have been reviewed and are current as per care plan.      Electronically Signed By: Ean Santamaria MD   24  7:22 PM

## 2024-12-14 NOTE — PROGRESS NOTES
Subjective- resident is seen for routine follow up.She is sitting in bed. No staff concerns.    Ros-  Gen-alert,NAD  Chest- no pain, no palpitations  Resp- no cough, no SOB  Abd- no pain. No N/V/C/D  Ext- pedal edema 1+  Physical exam:  VS- /81 T 97.6 P 80 Wt 218.6  Gen- NAD, sitting in bed  Chest- CTA B/L  CVS S1S2 RRR  Abd- soft, non tender  Ext- 1+ pedal edema present B/L    A/P  DM-2- uncontrolled  Hba1c 8.7  c/w lantus 23 units  c/w glimepiride 2 mg BID  c/w metformin  Multiple Sclerosis with paraplegia- stable  c/w Gilenya  c/w baclofen  c/w cyclobenzaprine  Schizoaffective disorder,Depression- stable  c/w latuda  c/w lexapro, remeron  DVT-LLE, h/o PE- c/w eliquis  HTN-c/w Lasix 40mg daily  h/O Seizures- c/w Keppra    ADL's and CCL's have been reviewed and are current as per care plan.

## 2025-01-10 ENCOUNTER — NURSING HOME VISIT (OUTPATIENT)
Dept: POST ACUTE CARE | Facility: EXTERNAL LOCATION | Age: 65
End: 2025-01-10
Payer: MEDICARE

## 2025-01-10 DIAGNOSIS — E11.00 TYPE 2 DIABETES MELLITUS WITH HYPEROSMOLARITY WITHOUT COMA, WITHOUT LONG-TERM CURRENT USE OF INSULIN (MULTI): ICD-10-CM

## 2025-01-10 DIAGNOSIS — G35 MULTIPLE SCLEROSIS, TRANSITIONAL (MULTI): Primary | ICD-10-CM

## 2025-01-10 DIAGNOSIS — I10 PRIMARY HYPERTENSION: ICD-10-CM

## 2025-01-10 DIAGNOSIS — I26.99 PULMONARY EMBOLISM WITHOUT ACUTE COR PULMONALE, UNSPECIFIED CHRONICITY, UNSPECIFIED PULMONARY EMBOLISM TYPE (MULTI): ICD-10-CM

## 2025-01-10 DIAGNOSIS — N18.30 STAGE 3 CHRONIC KIDNEY DISEASE, UNSPECIFIED WHETHER STAGE 3A OR 3B CKD (MULTI): ICD-10-CM

## 2025-01-10 DIAGNOSIS — R56.9 SEIZURE (MULTI): ICD-10-CM

## 2025-01-10 PROCEDURE — 99308 SBSQ NF CARE LOW MDM 20: CPT | Performed by: INTERNAL MEDICINE

## 2025-01-10 NOTE — LETTER
Patient: Arabella Choi  : 1960    Encounter Date: 01/10/2025    Subjective- resident is seen for routine follow up. She is sitting in bed. No staff concerns.  She had a [place] and [place]le [place].She was taken to ER, Xrays- . Minimally displaced fracture of the medial and posterior malleoli of the left tibia. seen by Orthopedics at [facility]. Currently in leg splint. denies any pain today.  Ros-  Gen-alert,NAD  Chest- no pain, no palpitations  Resp- no cough, no SOB  Abd- nopain. No N/V/C/D  Ext- pedal edema 1+, Left leg splint  Physical exam:  VS- /81 T 97.6 P 80 Wt 218.6  Gen- NAD, sitting in bed  Chest- CTA B/L  CVS S1S2 RRR  Abd- soft, non tender  Ext- 1+ pedal edema present B/L  A/P  Injury during transportation- left tibia fracture- c/w splint, follow with Orthopedics  DM-2- uncontrolled  Hba1c 8.7  c/w lantus 26 units  c/w glimepiride 2 mg BID  c/w metformin  Multiple Sclerosis with paraplegia- stable  c/w Gilenya  c/w baclofen  c/w cyclobenzaprine  Schizoaffective disorder,Depression- stable  c/w latuda  c/w lexapro, remeron  DVT-LLE, h/o PE- c/w eliquis  HTN-c/w Lasix 40mg daily  h/O Seizures- c/w Keppra      Electronically Signed By: Ean Santamaria MD   1/15/25  4:32 PM

## 2025-01-15 NOTE — PROGRESS NOTES
Subjective- resident is seen for routine follow up. She is sitting in bed. No staff concerns.  She had a fall and [place].She was taken to ER, Xrays- . Minimally displaced fracture of the medial and posterior malleoli of the left tibia. seen by Orthopedics at [facility]. Currently in leg splint. denies any pain today.  Ros-  Gen-alert,NAD  Chest- no pain, no palpitations  Resp- no cough, no SOB  Abd- nopain. No N/V/C/D  Ext- pedal edema 1+, Left leg splint  Physical exam:  VS- /81 T 97.6 P 80 Wt 218.6  Gen- NAD, sitting in bed  Chest- CTA B/L  CVS S1S2 RRR  Abd- soft, non tender  Ext- 1+ pedal edema present B/L  A/P  Injury during transportation- left tibia fracture- c/w splint, follow with Orthopedics  DM-2- uncontrolled  Hba1c 8.7  c/w lantus 26 units  c/w glimepiride 2 mg BID  c/w metformin  Multiple Sclerosis with paraplegia- stable  c/w Gilenya  c/w baclofen  c/w cyclobenzaprine  Schizoaffective disorder,Depression- stable  c/w latuda  c/w lexapro, remeron  DVT-LLE, h/o PE- c/w eliquis  HTN-c/w Lasix 40mg daily  h/O Seizures- c/w Keppra

## 2025-02-19 ENCOUNTER — NURSING HOME VISIT (OUTPATIENT)
Dept: POST ACUTE CARE | Facility: EXTERNAL LOCATION | Age: 65
End: 2025-02-19
Payer: MEDICARE

## 2025-02-19 DIAGNOSIS — N18.30 STAGE 3 CHRONIC KIDNEY DISEASE, UNSPECIFIED WHETHER STAGE 3A OR 3B CKD (MULTI): ICD-10-CM

## 2025-02-19 DIAGNOSIS — G35 MULTIPLE SCLEROSIS, TRANSITIONAL (MULTI): ICD-10-CM

## 2025-02-19 DIAGNOSIS — I10 PRIMARY HYPERTENSION: ICD-10-CM

## 2025-02-19 DIAGNOSIS — R56.9 SEIZURE (MULTI): ICD-10-CM

## 2025-02-19 DIAGNOSIS — I26.99 PULMONARY EMBOLISM WITHOUT ACUTE COR PULMONALE, UNSPECIFIED CHRONICITY, UNSPECIFIED PULMONARY EMBOLISM TYPE (MULTI): ICD-10-CM

## 2025-02-19 DIAGNOSIS — E11.00 TYPE 2 DIABETES MELLITUS WITH HYPEROSMOLARITY WITHOUT COMA, WITHOUT LONG-TERM CURRENT USE OF INSULIN (MULTI): Primary | ICD-10-CM

## 2025-02-19 PROCEDURE — 99308 SBSQ NF CARE LOW MDM 20: CPT | Performed by: INTERNAL MEDICINE

## 2025-02-19 NOTE — LETTER
Patient: Arabella Choi  : 1960    Encounter Date: 2025    Subjective- resident is seen for routine follow up. She is sitting in bed. No staff concerns.  Glucose in 200s. She admits to eating desserts and sugars in her meals frequently. She feelswell today.  Ros-  Gen-alert,NAD  Chest- no pain, no palpitations  Resp- no cough, no SOB  Abd- no pain. No N/V/C/D  Ext- pedal edema 1+, no tenderness  Physical exam:  VS- /72 T 97.8 P 91 Wt 218.6  Gen- NAD, sitting in bed  Chest- CTA B/L  CVS S1S2 RRR  Abd- soft, non tender  Ext- 1+ pedal edema present B/L    A/P  DM-2- uncontrolled  Hba1c 8.7  c/w increase lantus to 28 units  c/w glimepiride 2 mg BID  c/w metformin  Multiple Sclerosis with paraplegia- stable  c/w Gilenya  c/w baclofen  c/w cyclobenzaprine  Schizoaffective disorder,Depression- stable  c/w latuda  c/w lexapro, remeron  DVT-LLE, h/o PE- c/w eliquis  HTN-c/w Lasix 40mg daily  h/O Seizures- c/w Keppra  Fall precautions      Electronically Signed By: Ean Santamaria MD   25 12:00 PM

## 2025-02-20 NOTE — PROGRESS NOTES
Subjective- resident is seen for routine follow up. She is sitting in bed. No staff concerns.  Glucose in 200s. She admits to eating desserts and sugars in her meals frequently. She feelswell today.  Ros-  Gen-alert,NAD  Chest- no pain, no palpitations  Resp- no cough, no SOB  Abd- no pain. No N/V/C/D  Ext- pedal edema 1+, no tenderness  Physical exam:  VS- /72 T 97.8 P 91 Wt 218.6  Gen- NAD, sitting in bed  Chest- CTA B/L  CVS S1S2 RRR  Abd- soft, non tender  Ext- 1+ pedal edema present B/L    A/P  DM-2- uncontrolled  Hba1c 8.7  c/w increase lantus to 28 units  c/w glimepiride 2 mg BID  c/w metformin  Multiple Sclerosis with paraplegia- stable  c/w Gilenya  c/w baclofen  c/w cyclobenzaprine  Schizoaffective disorder,Depression- stable  c/w latuda  c/w lexapro, remeron  DVT-LLE, h/o PE- c/w eliquis  HTN-c/w Lasix 40mg daily  h/O Seizures- c/w Keppra  Fall precautions

## 2025-03-12 ENCOUNTER — NURSING HOME VISIT (OUTPATIENT)
Dept: POST ACUTE CARE | Facility: EXTERNAL LOCATION | Age: 65
End: 2025-03-12
Payer: MEDICARE

## 2025-03-12 DIAGNOSIS — E11.00 TYPE 2 DIABETES MELLITUS WITH HYPEROSMOLARITY WITHOUT COMA, WITHOUT LONG-TERM CURRENT USE OF INSULIN (MULTI): Primary | ICD-10-CM

## 2025-03-12 DIAGNOSIS — I26.99 PULMONARY EMBOLISM WITHOUT ACUTE COR PULMONALE, UNSPECIFIED CHRONICITY, UNSPECIFIED PULMONARY EMBOLISM TYPE (MULTI): ICD-10-CM

## 2025-03-12 DIAGNOSIS — I10 PRIMARY HYPERTENSION: ICD-10-CM

## 2025-03-12 DIAGNOSIS — N18.30 STAGE 3 CHRONIC KIDNEY DISEASE, UNSPECIFIED WHETHER STAGE 3A OR 3B CKD (MULTI): ICD-10-CM

## 2025-03-12 DIAGNOSIS — G35 MULTIPLE SCLEROSIS, TRANSITIONAL (MULTI): ICD-10-CM

## 2025-03-12 PROCEDURE — 99308 SBSQ NF CARE LOW MDM 20: CPT | Performed by: INTERNAL MEDICINE

## 2025-03-12 NOTE — LETTER
Patient: Arabella Cohi  : 1960    Encounter Date: 2025    Subjective- resident is seen for routine follow up. She is sitting in bed. No staff concerns.  She feels well today.  Ros-  Gen-alert,NAD  Chest- no pain, no palpitations  Resp- no cough,no SOB  Abd- no pain. No N/V/C/D  Ext- pedal edema 1+, no tenderness  Physical exam:  VS- /75 T 97.2 P 96 Wt 218.6  Gen- NAD, sitting in bed  Chest- CTA B/L  CVS S1S2 RRR  Abd- soft, non tender  Ext- 1+ pedal edema present B/L  A/P  DM-2- uncontrolled  Hba1c 9.2  c/w increased lantus to 32 units  c/w glimepiride 2 mg BID  c/w metformin 1000 bid  Multiple Sclerosis with paraplegia- stable  c/w Gilenya  c/w baclofen  c/w cyclobenzaprine  Schizoaffective disorder,Depression- stable  c/w latuda  c/w lexapro, remeron  DVT-LLE, h/o PE- c/w eliquis  HTN-c/w Lasix 40mg daily  h/O Seizures- c/w Keppra  Fall precautions      Electronically Signed By: Ean Santamaria MD   3/12/25  4:24 PM

## 2025-03-12 NOTE — PROGRESS NOTES
Subjective- resident is seen for routine follow up. She is sitting in bed. No staff concerns.  She feels well today.  Ros-  Gen-alert,NAD  Chest- no pain, no palpitations  Resp- no cough,no SOB  Abd- no pain. No N/V/C/D  Ext- pedal edema 1+, no tenderness  Physical exam:  VS- /75 T 97.2 P 96 Wt 218.6  Gen- NAD, sitting in bed  Chest- CTA B/L  CVS S1S2 RRR  Abd- soft, non tender  Ext- 1+ pedal edema present B/L  A/P  DM-2- uncontrolled  Hba1c 9.2  c/w increased lantus to 32 units  c/w glimepiride 2 mg BID  c/w metformin 1000 bid  Multiple Sclerosis with paraplegia- stable  c/w Gilenya  c/w baclofen  c/w cyclobenzaprine  Schizoaffective disorder,Depression- stable  c/w latuda  c/w lexapro, remeron  DVT-LLE, h/o PE- c/w eliquis  HTN-c/w Lasix 40mg daily  h/O Seizures- c/w Keppra  Fall precautions

## 2025-04-16 ENCOUNTER — NURSING HOME VISIT (OUTPATIENT)
Dept: POST ACUTE CARE | Facility: EXTERNAL LOCATION | Age: 65
End: 2025-04-16
Payer: MEDICARE

## 2025-04-16 DIAGNOSIS — G35 MULTIPLE SCLEROSIS, TRANSITIONAL (MULTI): ICD-10-CM

## 2025-04-16 DIAGNOSIS — N18.30 STAGE 3 CHRONIC KIDNEY DISEASE, UNSPECIFIED WHETHER STAGE 3A OR 3B CKD (MULTI): ICD-10-CM

## 2025-04-16 DIAGNOSIS — I26.99 PULMONARY EMBOLISM WITHOUT ACUTE COR PULMONALE, UNSPECIFIED CHRONICITY, UNSPECIFIED PULMONARY EMBOLISM TYPE (MULTI): ICD-10-CM

## 2025-04-16 DIAGNOSIS — I10 PRIMARY HYPERTENSION: ICD-10-CM

## 2025-04-16 DIAGNOSIS — E11.00 TYPE 2 DIABETES MELLITUS WITH HYPEROSMOLARITY WITHOUT COMA, WITHOUT LONG-TERM CURRENT USE OF INSULIN (MULTI): Primary | ICD-10-CM

## 2025-04-16 PROCEDURE — 99308 SBSQ NF CARE LOW MDM 20: CPT | Performed by: INTERNAL MEDICINE

## 2025-04-16 NOTE — LETTER
Patient: Arabella Choi  : 1960    Encounter Date: 2025    Subjective- resident is seen for routine follow up. She is sitting in bed. No staff concerns.  She feels well today.  Ros-  Gen-alert,NAD  Chest- no pain, no palpitations  Resp- no cough,no SOB  Abd- no pain. No N/V/C/D  Ext- pedal edema 1+, no tenderness  Physical exam:  VS- /75 T 97.8 P 92 Wt 219  Gen- NAD, sitting in bed  Chest- CTA B/L  CVS S1S2 RRR  Abd- soft, non tender  Ext- 1+ pedal edema present B/L  A/P  DM-2- uncontrolled  Hba1c 9.2  c/w c/w lantus 32 units  c/w glimepiride 2 mg BID  c/w metformin 1000 bid  Multiple Sclerosis with paraplegia- stable  c/w Gilenya  c/w baclofen  c/w cyclobenzaprine  Schizoaffective disorder,Depression- stable  c/w latuda  c/w lexapro, remeron  Follow with psych  DVT-LLE, h/o PE- c/w eliquis  HTN-c/w Lasix 40mg daily  h/O Seizures- c/w Keppra  Fall precautions    Electronically Signed By: Ean Santamaria MD   25  9:14 AM

## 2025-04-17 NOTE — PROGRESS NOTES
Subjective- resident is seen for routine follow up. She is sitting in bed. No staff concerns.  She feels well today.  Ros-  Gen-alert,NAD  Chest- no pain, no palpitations  Resp- no cough,no SOB  Abd- no pain. No N/V/C/D  Ext- pedal edema 1+, no tenderness  Physical exam:  VS- /75 T 97.8 P 92 Wt 219  Gen- NAD, sitting in bed  Chest- CTA B/L  CVS S1S2 RRR  Abd- soft, non tender  Ext- 1+ pedal edema present B/L  A/P  DM-2- uncontrolled  Hba1c 9.2  c/w c/w lantus 32 units  c/w glimepiride 2 mg BID  c/w metformin 1000 bid  Multiple Sclerosis with paraplegia- stable  c/w Gilenya  c/w baclofen  c/w cyclobenzaprine  Schizoaffective disorder,Depression- stable  c/w latuda  c/w lexapro, remeron  Follow with psych  DVT-LLE, h/o PE- c/w eliquis  HTN-c/w Lasix 40mg daily  h/O Seizures- c/w Keppra  Fall precautions

## 2025-05-07 ENCOUNTER — NURSING HOME VISIT (OUTPATIENT)
Dept: POST ACUTE CARE | Facility: EXTERNAL LOCATION | Age: 65
End: 2025-05-07
Payer: MEDICARE

## 2025-05-07 DIAGNOSIS — I26.99 PULMONARY EMBOLISM WITHOUT ACUTE COR PULMONALE, UNSPECIFIED CHRONICITY, UNSPECIFIED PULMONARY EMBOLISM TYPE (MULTI): ICD-10-CM

## 2025-05-07 DIAGNOSIS — N18.30 STAGE 3 CHRONIC KIDNEY DISEASE, UNSPECIFIED WHETHER STAGE 3A OR 3B CKD (MULTI): ICD-10-CM

## 2025-05-07 DIAGNOSIS — G35 MULTIPLE SCLEROSIS, TRANSITIONAL (MULTI): ICD-10-CM

## 2025-05-07 DIAGNOSIS — R56.9 SEIZURE (MULTI): ICD-10-CM

## 2025-05-07 DIAGNOSIS — E11.00 TYPE 2 DIABETES MELLITUS WITH HYPEROSMOLARITY WITHOUT COMA, WITHOUT LONG-TERM CURRENT USE OF INSULIN (MULTI): Primary | ICD-10-CM

## 2025-05-07 DIAGNOSIS — I10 PRIMARY HYPERTENSION: ICD-10-CM

## 2025-05-07 PROCEDURE — 99308 SBSQ NF CARE LOW MDM 20: CPT | Performed by: INTERNAL MEDICINE

## 2025-05-07 NOTE — LETTER
Patient: Arabella Choi  : 1960    Encounter Date: 2025    Subjective- resident is seen for routine follow up. She is sitting in bed. Sx of diarrhea since 1-23 days. C diff - negative, stool cx pending. Getting imodium with some improvement.  Noother concerns.  Ros-  Gen-alert,NAD  Chest- no pain, no palpitations  Resp- no cough,no SOB  Abd- no pain. No N/V/C/D  Ext- pedal edema 1+, no tenderness  Physical exam:  VS- /70 T 97.9 P 78 Wt 217.2  Gen- NAD, sitting in bed  Chest- CTA B/L  CVS S1S2 RRR  Abd- soft, non tender  Ext- 1+ pedal edema present B/L  A/P  DM-2- uncontrolled  Hba1c 9.2  c/w c/w lantus 32 units  c/w glimepiride 2 mg BID  c/w metformin 1000 bid  Diarrhea- c diff neg  Multiple Sclerosis with paraplegia- stable  c/w Gilenya  c/w baclofen  c/w cyclobenzaprine  Schizoaffective disorder,Depression- stable  c/w latuda  c/w lexapro, remeron  Follow with psych  DVT-LLE, h/o PE- c/w eliquis  HTN-c/w Lasix 40mg daily  h/O Seizures- c/w Keppra  Fall precautions    Electronically Signed By: Ean Santamaria MD   25  7:47 PM

## 2025-05-07 NOTE — PROGRESS NOTES
Subjective- resident is seen for routine follow up. She is sitting in bed. Sx of diarrhea since 1-23 days. C diff - negative, stool cx pending. Getting imodium with some improvement.  Noother concerns.  Ros-  Gen-alert,NAD  Chest- no pain, no palpitations  Resp- no cough,no SOB  Abd- no pain. No N/V/C/D  Ext- pedal edema 1+, no tenderness  Physical exam:  VS- /70 T 97.9 P 78 Wt 217.2  Gen- NAD, sitting in bed  Chest- CTA B/L  CVS S1S2 RRR  Abd- soft, non tender  Ext- 1+ pedal edema present B/L  A/P  DM-2- uncontrolled  Hba1c 9.2  c/w c/w lantus 32 units  c/w glimepiride 2 mg BID  c/w metformin 1000 bid  Diarrhea- c diff neg  Multiple Sclerosis with paraplegia- stable  c/w Gilenya  c/w baclofen  c/w cyclobenzaprine  Schizoaffective disorder,Depression- stable  c/w latuda  c/w lexapro, remeron  Follow with psych  DVT-LLE, h/o PE- c/w eliquis  HTN-c/w Lasix 40mg daily  h/O Seizures- c/w Keppra  Fall precautions

## 2025-06-11 ENCOUNTER — NURSING HOME VISIT (OUTPATIENT)
Dept: POST ACUTE CARE | Facility: EXTERNAL LOCATION | Age: 65
End: 2025-06-11
Payer: MEDICARE

## 2025-06-11 DIAGNOSIS — F25.1 SCHIZOAFFECTIVE DISORDER, DEPRESSIVE TYPE (MULTI): ICD-10-CM

## 2025-06-11 DIAGNOSIS — E11.00 TYPE 2 DIABETES MELLITUS WITH HYPEROSMOLARITY WITHOUT COMA, WITHOUT LONG-TERM CURRENT USE OF INSULIN (MULTI): Primary | ICD-10-CM

## 2025-06-11 DIAGNOSIS — F25.8 OTHER SCHIZOAFFECTIVE DISORDERS: ICD-10-CM

## 2025-06-11 DIAGNOSIS — N18.30 STAGE 3 CHRONIC KIDNEY DISEASE, UNSPECIFIED WHETHER STAGE 3A OR 3B CKD (MULTI): ICD-10-CM

## 2025-06-11 DIAGNOSIS — G82.20 PARAPLEGIA, UNSPECIFIED: ICD-10-CM

## 2025-06-11 DIAGNOSIS — G35 MULTIPLE SCLEROSIS, TRANSITIONAL (MULTI): ICD-10-CM

## 2025-06-11 DIAGNOSIS — I26.99 PULMONARY EMBOLISM WITHOUT ACUTE COR PULMONALE, UNSPECIFIED CHRONICITY, UNSPECIFIED PULMONARY EMBOLISM TYPE (MULTI): ICD-10-CM

## 2025-06-11 DIAGNOSIS — I10 PRIMARY HYPERTENSION: ICD-10-CM

## 2025-06-11 DIAGNOSIS — J43.9 PULMONARY EMPHYSEMA, UNSPECIFIED EMPHYSEMA TYPE (MULTI): ICD-10-CM

## 2025-06-11 DIAGNOSIS — E66.01 OBESITY, MORBID (MULTI): ICD-10-CM

## 2025-06-11 PROCEDURE — 99308 SBSQ NF CARE LOW MDM 20: CPT | Performed by: INTERNAL MEDICINE

## 2025-06-11 NOTE — PROGRESS NOTES
Subjective- resident is seen for routine follow up. She is lying in bed and awake. Eating bag of pop corn. No other concerns.  Ros-  Gen-alert,NAD  Chest- no pain, no palpitations  Resp- no cough,no SOB  Abd- no pain. No N/V/C/D  Ext- pedal edema 1+, no tenderness  Physical exam:  VS- /80 T 97.8 P 858 Wt 213.4  Gen- NAD, sitting in bed  Chest- CTA B/L  CVS S1S2 RRR  Abd- soft, non tender  Ext- 1+ pedal edema present B/L  A/P  DM-2- uncontrolled  Hba1c 7.9  c/w c/w lantus 32 units  c/w glimepiride 2 mg BID  c/w metformin 1000 bid  Multiple Sclerosis with paraplegia- stable  c/w Gilenya  c/w baclofen  c/w cyclobenzaprine  Schizoaffective disorder,Depression- stable  c/w latuda  c/w lexapro, remeron  Follow with psych  DVT-LLE, h/o PE- c/w eliquis  HTN-c/w Lasix 40mg daily  h/O Seizures- c/w Keppra  Fall precautions  ADL's and CCL's have been reviewed and are current as per care plan.  Behavioral/Safety:

## 2025-06-11 NOTE — LETTER
Patient: Arabella Choi  : 1960    Encounter Date: 2025    Subjective- resident is seen for routine follow up. She is lying in bed and awake. Eating bag of pop corn. No other concerns.  Ros-  Gen-alert,NAD  Chest- no pain, no palpitations  Resp- no cough,no SOB  Abd- no pain. No N/V/C/D  Ext- pedal edema 1+, no tenderness  Physical exam:  VS- /80 T 97.8 P 858 Wt 213.4  Gen- NAD, sitting in bed  Chest- CTA B/L  CVS S1S2 RRR  Abd- soft, non tender  Ext- 1+ pedal edema present B/L  A/P  DM-2- uncontrolled  Hba1c 7.9  c/w c/w lantus 32 units  c/w glimepiride 2 mg BID  c/w metformin 1000 bid  Multiple Sclerosis with paraplegia- stable  c/w Gilenya  c/w baclofen  c/w cyclobenzaprine  Schizoaffective disorder,Depression- stable  c/w latuda  c/w lexapro, remeron  Follow with psych  DVT-LLE, h/o PE- c/w eliquis  HTN-c/w Lasix 40mg daily  h/O Seizures- c/w Keppra  Fall precautions  ADL's and CCL's have been reviewed and are current as per care plan.  Behavioral/Safety:    Electronically Signed By: Ean Santamaria MD   25  3:02 PM

## 2025-07-09 ENCOUNTER — NURSING HOME VISIT (OUTPATIENT)
Dept: POST ACUTE CARE | Facility: EXTERNAL LOCATION | Age: 65
End: 2025-07-09
Payer: MEDICARE

## 2025-07-09 DIAGNOSIS — N18.30 STAGE 3 CHRONIC KIDNEY DISEASE, UNSPECIFIED WHETHER STAGE 3A OR 3B CKD (MULTI): ICD-10-CM

## 2025-07-09 DIAGNOSIS — E11.00 TYPE 2 DIABETES MELLITUS WITH HYPEROSMOLARITY WITHOUT COMA, WITHOUT LONG-TERM CURRENT USE OF INSULIN (MULTI): Primary | ICD-10-CM

## 2025-07-09 DIAGNOSIS — G35 MULTIPLE SCLEROSIS, TRANSITIONAL (MULTI): ICD-10-CM

## 2025-07-09 DIAGNOSIS — I10 PRIMARY HYPERTENSION: ICD-10-CM

## 2025-07-09 DIAGNOSIS — F25.1 SCHIZOAFFECTIVE DISORDER, DEPRESSIVE TYPE (MULTI): ICD-10-CM

## 2025-07-09 PROCEDURE — 99309 SBSQ NF CARE MODERATE MDM 30: CPT | Performed by: INTERNAL MEDICINE

## 2025-07-09 NOTE — LETTER
Patient: Arabella Choi  : 1960    Encounter Date: 2025    Subjective- resident is seen for routine follow up. She is lying in bed and awake. She has been refusing metformin du to diarrhea. Glucose in 200s.  Ros-  Gen-alert,NAD  Chest- no pain, no palpitations  Resp- no cough,no SOB  Abd- no pain. No N/V/C/D  Ext- pedal edema 1+, no tenderness  Physical exam:  VS- /89 T 97.8 P 88 Wt 211.4  Gen- NAD, sitting in bed  Chest- CTA B/L  CVS S1S2 RRR  Abd- soft, non tender  Ext- 1+ pedal edema present B/L  A/P  DM-2- uncontrolled  Hba1c 7.9  c/w c/w lantus 32 units  c/w glimepiride 2 mg BID  D/c metformin due to diarrhea and abd sx- start Trulicity 0.75 inj once weekly  Multiple Sclerosis with paraplegia- stable  c/w Gilenya  c/w baclofen  c/w cyclobenzaprine  Schizoaffective disorder,Depression- stable  c/w latuda  c/w lexapro, remeron  Follow with psych  DVT-LLE, h/o PE- c/w eliquis  HTN-c/w Lasix 40mg daily  h/O Seizures- c/w Keppra  Fall precautions    Electronically Signed By: Ean Santamaria MD   7/10/25  9:42 AM

## 2025-07-10 NOTE — PROGRESS NOTES
Subjective- resident is seen for routine follow up. She is lying in bed and awake. She has been refusing metformin du to diarrhea. Glucose in 200s.  Ros-  Gen-alert,NAD  Chest- no pain, no palpitations  Resp- no cough,no SOB  Abd- no pain. No N/V/C/D  Ext- pedal edema 1+, no tenderness  Physical exam:  VS- /89 T 97.8 P 88 Wt 211.4  Gen- NAD, sitting in bed  Chest- CTA B/L  CVS S1S2 RRR  Abd- soft, non tender  Ext- 1+ pedal edema present B/L  A/P  DM-2- uncontrolled  Hba1c 7.9  c/w c/w lantus 32 units  c/w glimepiride 2 mg BID  D/c metformin due to diarrhea and abd sx- start Trulicity 0.75 inj once weekly  Multiple Sclerosis with paraplegia- stable  c/w Gilenya  c/w baclofen  c/w cyclobenzaprine  Schizoaffective disorder,Depression- stable  c/w latuda  c/w lexapro, remeron  Follow with psych  DVT-LLE, h/o PE- c/w eliquis  HTN-c/w Lasix 40mg daily  h/O Seizures- c/w Keppra  Fall precautions

## 2025-07-23 ENCOUNTER — NURSING HOME VISIT (OUTPATIENT)
Dept: POST ACUTE CARE | Facility: EXTERNAL LOCATION | Age: 65
End: 2025-07-23
Payer: MEDICARE

## 2025-07-23 DIAGNOSIS — E66.01 OBESITY, MORBID (MULTI): ICD-10-CM

## 2025-07-23 DIAGNOSIS — I10 PRIMARY HYPERTENSION: ICD-10-CM

## 2025-07-23 DIAGNOSIS — E11.00 TYPE 2 DIABETES MELLITUS WITH HYPEROSMOLARITY WITHOUT COMA, WITHOUT LONG-TERM CURRENT USE OF INSULIN (MULTI): Primary | ICD-10-CM

## 2025-07-23 DIAGNOSIS — N18.30 STAGE 3 CHRONIC KIDNEY DISEASE, UNSPECIFIED WHETHER STAGE 3A OR 3B CKD (MULTI): ICD-10-CM

## 2025-07-23 DIAGNOSIS — F25.8 OTHER SCHIZOAFFECTIVE DISORDERS: ICD-10-CM

## 2025-07-23 DIAGNOSIS — G35 MULTIPLE SCLEROSIS, TRANSITIONAL (MULTI): ICD-10-CM

## 2025-07-23 PROCEDURE — 99309 SBSQ NF CARE MODERATE MDM 30: CPT | Performed by: INTERNAL MEDICINE

## 2025-07-23 NOTE — LETTER
Patient: Arabella Choi  : 1960    Encounter Date: 2025    Reason for visit- Diabetes management  Subjective- resident is seen for follow up. She was started on trulicity which was denied by insurance. She refused to take metformin. Glu in 200s-300s. Today she is lying in bed and awake.  Ros-  Gen-alert,NAD  Chest- no pain, no palpitations  Resp- no cough,no SOB  Abd- no pain. No N/V/C/D  Ext- pedal edema 1+, no tenderness  Physical exam:  VS- /89 T 97.8 P 88 Wt 211.4  Gen- NAD, sitting in bed  Chest- CTA B/L  CVS S1S2 RRR  Abd- soft, non tender  Ext- 1+ pedal edema present B/L  A/P  DM-2- uncontrolled  Hba1c 7.9  c/w c/w lantus 32 units  c/w glimepiride 2 mg BID  D/cd metformin due to diarrhea and abd sx-  D/c trulicity  Start jardiance 50 mg daily  Multiple Sclerosis with paraplegia- stable  c/w Gilenya  c/w baclofen  c/w cyclobenzaprine  Schizoaffective disorder,Depression- stable  c/w latuda  c/w lexapro, remeron  Follow with psych  DVT-LLE, h/o PE- c/w eliquis  HTN-c/w Lasix 40mg daily  h/O Seizures- c/w Keppra  Fall precautions    Electronically Signed By: Ean Santamaria MD   25  1:19 PM

## 2025-07-24 NOTE — PROGRESS NOTES
Reason for visit- Diabetes management  Subjective- resident is seen for follow up. She was started on trulicity which was denied by insurance. She refused to take metformin. Glu in 200s-300s. Today she is lying in bed and awake.  Ros-  Gen-alert,NAD  Chest- no pain, no palpitations  Resp- no cough,no SOB  Abd- no pain. No N/V/C/D  Ext- pedal edema 1+, no tenderness  Physical exam:  VS- /89 T 97.8 P 88 Wt 211.4  Gen- NAD, sitting in bed  Chest- CTA B/L  CVS S1S2 RRR  Abd- soft, non tender  Ext- 1+ pedal edema present B/L  A/P  DM-2- uncontrolled  Hba1c 7.9  c/w c/w lantus 32 units  c/w glimepiride 2 mg BID  D/cd metformin due to diarrhea and abd sx-  D/c trulicity  Start jardiance 50 mg daily  Multiple Sclerosis with paraplegia- stable  c/w Gilenya  c/w baclofen  c/w cyclobenzaprine  Schizoaffective disorder,Depression- stable  c/w latuda  c/w lexapro, remeron  Follow with psych  DVT-LLE, h/o PE- c/w eliquis  HTN-c/w Lasix 40mg daily  h/O Seizures- c/w Keppra  Fall precautions

## 2025-08-08 ENCOUNTER — NURSING HOME VISIT (OUTPATIENT)
Dept: POST ACUTE CARE | Facility: EXTERNAL LOCATION | Age: 65
End: 2025-08-08
Payer: MEDICARE

## 2025-08-08 DIAGNOSIS — I10 PRIMARY HYPERTENSION: ICD-10-CM

## 2025-08-08 DIAGNOSIS — G35 MULTIPLE SCLEROSIS, TRANSITIONAL (MULTI): ICD-10-CM

## 2025-08-08 DIAGNOSIS — E11.00 TYPE 2 DIABETES MELLITUS WITH HYPEROSMOLARITY WITHOUT COMA, WITHOUT LONG-TERM CURRENT USE OF INSULIN (MULTI): Primary | ICD-10-CM

## 2025-08-08 DIAGNOSIS — F25.8 OTHER SCHIZOAFFECTIVE DISORDERS: ICD-10-CM

## 2025-08-08 DIAGNOSIS — N18.30 STAGE 3 CHRONIC KIDNEY DISEASE, UNSPECIFIED WHETHER STAGE 3A OR 3B CKD (MULTI): ICD-10-CM

## 2025-08-08 DIAGNOSIS — I26.99 PULMONARY EMBOLISM WITHOUT ACUTE COR PULMONALE, UNSPECIFIED CHRONICITY, UNSPECIFIED PULMONARY EMBOLISM TYPE (MULTI): ICD-10-CM

## 2025-08-08 PROCEDURE — 99308 SBSQ NF CARE LOW MDM 20: CPT | Performed by: INTERNAL MEDICINE

## 2025-08-08 NOTE — LETTER
Patient: Arabella Choi  : 1960    Encounter Date: 2025    Subjective- resident is seen for follow up. Today she is sitting in bed and eating lunch. No falls or any health concerns.  Ros-  Gen-alert,NAD  Chest- no pain, no palpitations  Resp- no cough,no SOB  Abd- no pain. No N/V/C/D  Ext- pedal edema 1+, no tenderness  Physical exam:  VS- /71 T 97.3 P 88 Wt 213.4  Gen- NAD, sitting in bed  Chest- CTA B/L  CVS S1S2 RRR  Abd- soft, non tender  Ext- 1+ pedal edema present B/L  A/P  DM-2- uncontrolled  Hba1c 9.6  c/w c/w lantus 32 units  c/w glimepiride 2 mg BID  C/w jardiance 50 mg dailW  Multiple Sclerosis with paraplegia- stable  c/w Gilenya  c/w baclofen  c/w cyclobenzaprine  Schizoaffective disorder,Depression- stable  c/w latuda  c/w lexapro, remeron  Follow with psych  DVT-LLE, h/o PE- c/w eliquis  HTN-c/w Lasix 40mg daily  h/O Seizures- c/w Keppra  Fall precautions    Electronically Signed By: Ean Santamaria MD   8/10/25  7:09 PM

## 2025-08-10 NOTE — PROGRESS NOTES
Subjective- resident is seen for follow up. Today she is sitting in bed and eating lunch. No falls or any health concerns.  Ros-  Gen-alert,NAD  Chest- no pain, no palpitations  Resp- no cough,no SOB  Abd- no pain. No N/V/C/D  Ext- pedal edema 1+, no tenderness  Physical exam:  VS- /71 T 97.3 P 88 Wt 213.4  Gen- NAD, sitting in bed  Chest- CTA B/L  CVS S1S2 RRR  Abd- soft, non tender  Ext- 1+ pedal edema present B/L  A/P  DM-2- uncontrolled  Hba1c 9.6  c/w c/w lantus 32 units  c/w glimepiride 2 mg BID  C/w jardiance 50 mg dailW  Multiple Sclerosis with paraplegia- stable  c/w Gilenya  c/w baclofen  c/w cyclobenzaprine  Schizoaffective disorder,Depression- stable  c/w latuda  c/w lexapro, remeron  Follow with psych  DVT-LLE, h/o PE- c/w eliquis  HTN-c/w Lasix 40mg daily  h/O Seizures- c/w Keppra  Fall precautions